# Patient Record
Sex: FEMALE | Race: BLACK OR AFRICAN AMERICAN | Employment: OTHER | URBAN - METROPOLITAN AREA
[De-identification: names, ages, dates, MRNs, and addresses within clinical notes are randomized per-mention and may not be internally consistent; named-entity substitution may affect disease eponyms.]

---

## 2017-03-15 ENCOUNTER — HOSPITAL ENCOUNTER (OUTPATIENT)
Dept: LAB | Age: 72
Discharge: HOME | End: 2017-03-15
Payer: MEDICARE

## 2017-03-15 ENCOUNTER — OFFICE VISIT (OUTPATIENT)
Dept: ONCOLOGY | Age: 72
End: 2017-03-15

## 2017-03-15 VITALS
DIASTOLIC BLOOD PRESSURE: 61 MMHG | TEMPERATURE: 96.7 F | RESPIRATION RATE: 18 BRPM | SYSTOLIC BLOOD PRESSURE: 149 MMHG | HEART RATE: 69 BPM

## 2017-03-15 DIAGNOSIS — C55 MALIGNANT NEOPLASM OF UTERUS, UNSPECIFIED SITE (HCC): ICD-10-CM

## 2017-03-15 DIAGNOSIS — N95.0 POST-MENOPAUSAL BLEEDING: ICD-10-CM

## 2017-03-15 DIAGNOSIS — N76.1 SUBACUTE VAGINITIS: Primary | ICD-10-CM

## 2017-03-15 PROCEDURE — 88305 TISSUE EXAM BY PATHOLOGIST: CPT | Performed by: OBSTETRICS & GYNECOLOGY

## 2017-03-15 RX ORDER — LANOLIN ALCOHOL/MO/W.PET/CERES
1000 CREAM (GRAM) TOPICAL DAILY
COMMUNITY
Start: 2016-10-24

## 2017-03-15 RX ORDER — LEVOTHYROXINE SODIUM 25 UG/1
25 TABLET ORAL DAILY
COMMUNITY
Start: 2017-03-13

## 2017-03-15 RX ORDER — SERTRALINE HYDROCHLORIDE 25 MG/1
25 TABLET, FILM COATED ORAL DAILY
COMMUNITY
Start: 2016-10-24

## 2017-03-15 RX ORDER — METRONIDAZOLE 500 MG/1
500 TABLET ORAL 3 TIMES DAILY
Qty: 42 TAB | Refills: 0 | Status: SHIPPED | OUTPATIENT
Start: 2017-03-15 | End: 2017-03-29

## 2017-03-15 RX ORDER — METOPROLOL TARTRATE 25 MG/1
25 TABLET, FILM COATED ORAL 2 TIMES DAILY
COMMUNITY
Start: 2016-09-19

## 2017-03-15 RX ORDER — POTASSIUM CHLORIDE 20 MEQ/1
20 TABLET, EXTENDED RELEASE ORAL DAILY
COMMUNITY
Start: 2016-10-18

## 2017-03-15 RX ORDER — VALSARTAN 160 MG/1
160 TABLET ORAL DAILY
COMMUNITY
Start: 2016-10-24

## 2017-03-15 RX ORDER — INSULIN GLARGINE 100 [IU]/ML
15 INJECTION, SOLUTION SUBCUTANEOUS
COMMUNITY
Start: 2016-10-24

## 2017-03-15 RX ORDER — HYDRALAZINE HYDROCHLORIDE 50 MG/1
50 TABLET, FILM COATED ORAL 3 TIMES DAILY
COMMUNITY
Start: 2016-10-24

## 2017-03-15 RX ORDER — ERGOCALCIFEROL 1.25 MG/1
125 CAPSULE ORAL
COMMUNITY
Start: 2016-10-24 | End: 2017-03-29

## 2017-03-15 RX ORDER — CLOPIDOGREL BISULFATE 75 MG/1
75 TABLET ORAL DAILY
COMMUNITY
Start: 2016-10-24

## 2017-03-15 RX ORDER — PEN NEEDLE, DIABETIC 31 GX3/16"
1 NEEDLE, DISPOSABLE MISCELLANEOUS
COMMUNITY
Start: 2016-10-10 | End: 2017-03-29

## 2017-03-15 RX ORDER — FUROSEMIDE 40 MG/1
20 TABLET ORAL
COMMUNITY
Start: 2016-10-24

## 2017-03-15 RX ORDER — QUETIAPINE FUMARATE 25 MG/1
25 TABLET, FILM COATED ORAL 2 TIMES DAILY
COMMUNITY
Start: 2016-10-24

## 2017-03-15 RX ORDER — PRAVASTATIN SODIUM 40 MG/1
40 TABLET ORAL
COMMUNITY
Start: 2016-10-24

## 2017-03-15 RX ORDER — AMLODIPINE BESYLATE 5 MG/1
5 TABLET ORAL DAILY
COMMUNITY
Start: 2016-10-24

## 2017-03-15 NOTE — MR AVS SNAPSHOT
Visit Information Date & Time Provider Department Dept. Phone Encounter #  
 3/15/2017  3:00 PM [id number] MD Virgilio Dong [facility]  Your Appointments 3/29/2017  4:00 PM  
PRE OP CPE with 2211 Bianka Frances MD  
[address] [facility]) Appt Note: PRE OP AND COUNSELING  
 [address] 4/17/2017  2:45 PM  
POST OP with 2211 MD Virgilio Dong [facility]) Appt Note: 2WKS POST OP  
 [id number] [address] Upcoming Health Maintenance Date Due Hepatitis C Screening 1945 DTaP/Tdap/Td series (1 - Tdap) 8/21/1966 BREAST CANCER SCRN MAMMOGRAM 8/21/1995 FOBT Q 1 YEAR AGE 50-75 8/21/1995 ZOSTER VACCINE AGE 60> 8/21/2005 GLAUCOMA SCREENING Q2Y 8/21/2010 OSTEOPOROSIS SCREENING (DEXA) 8/21/2010 Pneumococcal 65+ High/Highest Risk (1 of 2 - PCV13) 8/21/2010 MEDICARE YEARLY EXAM 8/21/2010 INFLUENZA AGE 9 TO ADULT 8/1/2016 Allergies as of 3/15/2017  Review Complete On: 3/15/2017 By: Adriana Aguiar LPN Severity Noted Reaction Type Reactions Haloperidol Lactate High 08/04/2013    Other (comments) Lisinopril High 03/31/2011    Unknown (comments) Rosiglitazone High 03/31/2011    Unknown (comments) Current Immunizations  Never Reviewed No immunizations on file. Not reviewed this visit You Were Diagnosed With   
  
 Codes Comments Subacute vaginitis    -  Primary ICD-10-CM: N76.1 ICD-9-CM: 616.10 Malignant neoplasm of uterus, unspecified site [facility])     ICD-10-CM: C55 ICD-9-CM: 494 Post-menopausal bleeding     ICD-10-CM: N95.0 ICD-9-CM: 627.1 Vitals BP Pulse Temp Resp OB Status Smoking Status 149/61 69 96.7 °F (35.9 °C) (Oral) 18 Postmenopausal Never Smoker Preferred Pharmacy Pharmacy Name Phone [facility] [address], [address] [place] [address] [phone] Your Updated Medication List  
  
   
This list is accurate as of: 3/15/17  4:28 PM.  Always use your most recent med list. amLODIPine 5 mg tablet Commonly known as:  Kaci Steff 5 mg.  
  
 clopidogrel 75 mg Tab Commonly known as:  PLAVIX 75 mg.  
  
 cyanocobalamin 1,000 mcg tablet  
1,000 mcg.  
  
 ergocalciferol 50,000 unit capsule Commonly known as:  ERGOCALCIFEROL  
50,000 Units. furosemide 40 mg tablet Commonly known as:  LASIX  
20 mg.  
  
 glucose blood VI test strips strip Commonly known as:  ASCENSIA AUTODISC VI, ONE TOUCH ULTRA TEST VI  
50 Each.  
  
 hydrALAZINE 50 mg tablet Commonly known as:  APRESOLINE 50 mg.  
  
 insulin glargine 100 unit/mL (3 mL) pen Commonly known as:  LANTUS SOLOSTAR  
15 Units. Insulin Needles (Disposable) 31 gauge x 3/16\" Ndle  
1 Units. levothyroxine 25 mcg tablet Commonly known as:  SYNTHROID  
25 mcg.  
  
 metoprolol tartrate 25 mg tablet Commonly known as:  LOPRESSOR  
25 mg.  
  
 metroNIDAZOLE 500 mg tablet Commonly known as:  FLAGYL Take 1 Tab by mouth three (3) times daily for 14 days. potassium chloride 20 mEq tablet Commonly known as:  K-DUR, KLOR-CON 20 mEq.  
  
 pravastatin 40 mg tablet Commonly known as:  PRAVACHOL 40 mg. QUEtiapine 25 mg tablet Commonly known as:  SEROquel 25 mg.  
  
 sertraline 25 mg tablet Commonly known as:  ZOLOFT  
25 mg.  
  
 valsartan 160 mg tablet Commonly known as:  DIOVAN  
160 mg.  
  
  
  
  
Prescriptions Sent to Pharmacy Refills  
 metroNIDAZOLE (FLAGYL) 500 mg tablet 0 Sig: Take 1 Tab by mouth three (3) times daily for 14 days.   
 Class: Normal  
 Pharmacy: [address], [address] [place] [address] #: [phone] Route: Oral  
  
We Performed the Following BIOPSY/EXCIS CERVICAL LESN [id number] CPT(R)] Introducing [facility] SERVICES! Romayne Duster introduces NoFlo patient portal. Now you can access parts of your medical record, email your doctor's office, and request medication refills online. 1. In your internet browser, go to [email or web address] 2. Click on the First Time User? Click Here link in the Sign In box. You will see the New Member Sign Up page. 3. Enter your NoFlo Access Code exactly as it appears below. You will not need to use this code after youve completed the sign-up process. If you do not sign up before the expiration date, you must request a new code. · NoFlo Access Code: [email or web address] Expires: 6/13/2017  4:28 PM 
 
4. Enter the last four digits of your Social Security Number (xxxx) and Date of Birth (mm/dd/yyyy) as indicated and click Submit. You will be taken to the next sign-up page. 5. Create a NoFlo ID. This will be your NoFlo login ID and cannot be changed, so think of one that is secure and easy to remember. 6. Create a NoFlo password. You can change your password at any time. 7. Enter your Password Reset Question and Answer. This can be used at a later time if you forget your password. 8. Enter your e-mail address. You will receive e-mail notification when new information is available in [address]. 9. Click Sign Up. You can now view and download portions of your medical record. 10. Click the Download Summary menu link to download a portable copy of your medical information. If you have questions, please visit the Frequently Asked Questions section of the NoFlo website. Remember, NoFlo is NOT to be used for urgent needs. For medical emergencies, dial 911. Now available from your iPhone and Android! Please provide this summary of care documentation to your next provider. Your primary care clinician is listed as [facility]. If you have any questions after today's visit, please call [phone].

## 2017-03-16 ENCOUNTER — TELEPHONE (OUTPATIENT)
Dept: ONCOLOGY | Age: 72
End: 2017-03-16

## 2017-03-16 NOTE — TELEPHONE ENCOUNTER
Please contact patient's daughter Patrick Ramos at [phone] regarding appointment on 3/29/17 and her mother's results. Daughter is requesting a sooner appointment.

## 2017-03-16 NOTE — PATIENT INSTRUCTIONS
Uterine Cancer: Care Instructions  Your Care Instructions  Uterine cancer is the rapid growth of abnormal cells that line the uterus. It also is called endometrial cancer. These cells may spread to nearby organs, lymph glands, or distant organs. Uterine cancer can be cured most often when found early. Treatment may include surgery to remove the uterus, ovaries, fallopian tubes, and sometimes the pelvic lymph nodes. Radiation and hormones to stop cancer growth also are sometimes used. Chemotherapy may be used if the cancer has spread. Having cancer can be scary. You may feel many emotions and may need some help coping. Seek out family, friends, and counselors for support. You can do things at home to make yourself feel better while you go through treatment. You also can call the [employer] (1-980.677.7818) or visit its website at GigaTrust for more information. Follow-up care is a key part of your treatment and safety. Be sure to make and go to all appointments, and call your doctor if you are having problems. It's also a good idea to know your test results and keep a list of the medicines you take. How can you care for yourself at home? · Take your medicines exactly as prescribed. Call your doctor if you think you are having a problem with your medicine. You may get medicine for nausea and vomiting if you have these side effects. · Eat healthy food. If you do not feel like eating, try to eat food that has protein and extra calories to keep up your strength and prevent weight loss. Drink liquid meal replacements for extra calories and protein. Try to eat your main meal early. · Get some physical activity every day, but do not get too tired. Keep doing the hobbies you enjoy as your energy allows. · Take steps to control your stress and workload. Learn relaxation techniques. ¨ Share your feelings. Stress and tension affect our emotions.  By expressing your feelings to others, you may be able to understand and cope with them. ¨ Consider joining a support group. Talking about a problem with your spouse, a good friend, or other people with similar problems is a good way to reduce tension and stress. ¨ Express yourself through art. Try writing, dance, art, or crafts to relieve tension. Some dance, writing, or art groups may be available just for people who have cancer. ¨ Be kind to your body and mind. Getting enough sleep, eating a healthy diet, and taking time to do things you enjoy can contribute to an overall feeling of balance in your life and help reduce stress. ¨ Get help if you need it. Discuss your concerns with your doctor or counselor. · If you are vomiting or have diarrhea:  ¨ Drink plenty of fluids (enough so that your urine is light yellow or clear like water) to prevent dehydration. Choose water and other caffeine-free clear liquids. If you have kidney, heart, or liver disease and have to limit fluids, talk with your doctor before you increase the amount of fluids you drink. ¨ When you are able to eat, try clear soups, mild foods, and liquids until all symptoms are gone for 12 to 48 hours. Other good choices include dry toast, crackers, cooked cereal, and gelatin dessert, such as Jell-O.  · Take care of your urinary tract to prevent problems such as infection, which can be caused by uterine cancer and its treatment. Limit drinks with caffeine, drink plenty of fluids, and urinate every 3 to 4 hours. Consider adding cranberry juice to your diet. · If you have not already done so, prepare a list of advance directives. Advance directives are instructions to your doctor and family members about what kind of care you want if you become unable to speak or express yourself. When should you call for help? Call 911 anytime you think you may need emergency care. For example, call if:  · You passed out (lost consciousness). · You have severe belly pain.   Call your doctor now or seek immediate medical care if:  · You have severe vaginal bleeding. You are passing blood clots and soaking through a pad each hour for 2 or more hours. · You have belly pain. · You are dizzy or lightheaded, or you feel like you may faint. · You have a fever. · You have severe constipation. · You stop urinating. · You have severe vomiting that you cannot control. Watch closely for changes in your health, and be sure to contact your doctor if:  · You have spotting of blood from your vagina. · You feel very sad, anxious or both. Where can you learn more? Go to http://edison-lucia.info/. Enter E343 in the search box to learn more about \"Uterine Cancer: Care Instructions. \"  Current as of: July 26, 2016  Content Version: 11.1  © 3198-5817 [employer]. Care instructions adapted under license by [employer] (which disclaims liability or warranty for this information). If you have questions about a medical condition or this instruction, always ask your healthcare professional. [place] [address] any warranty or liability for your use of this information. Laparoscopic Hysterectomy: Before Your Surgery  What is a laparoscopic hysterectomy? A hysterectomy is surgery to take out the uterus. In some cases, the ovaries and fallopian tubes also are taken out at the same time. The doctor makes one or more small cuts in the belly. These cuts are called incisions. They let the doctor insert tools to do the surgery. One of these tools is a tube with a light on it. It's called a laparoscope, or scope. The scope and the other tools allow the doctor to free the uterus. The doctor then removes the uterus through the small cuts. In a total hysterectomy, the doctor takes out the uterus and the cervix. In a supracervical hysterectomy, only the uterus is taken out. Most women go home in 1 to 2 days. You may need about 4 to 6 weeks to fully recover.   After the surgery, you will not have periods. You will not be able to get pregnant. If there is a chance that you will want to have a baby, talk to your doctor about other treatment options. Your doctor may advise you to take hormone pills if your ovaries are removed. Your doctor will talk to you about the risks and benefits of hormones. He or she will also tell you how long to take them. This surgery probably won't lower your interest in sex. In fact, some women enjoy sex more. This may be because they no longer have to worry about birth control or heavy bleeding. Follow-up care is a key part of your treatment and safety. Be sure to make and go to all appointments, and call your doctor if you are having problems. It's also a good idea to know your test results and keep a list of the medicines you take. What happens before surgery? Surgery can be stressful. This information will help you understand what you can expect. And it will help you safely prepare for surgery. Preparing for surgery  · Understand exactly what surgery is planned, along with the risks, benefits, and other options. · Tell your doctors ALL the medicines, vitamins, supplements, and herbal remedies you take. Some of these can increase the risk of bleeding or interact with anesthesia. · If you take blood thinners, such as warfarin (Coumadin), clopidogrel (Plavix), or aspirin, be sure to talk to your doctor. He or she will tell you if you should stop taking these medicines before your surgery. Make sure that you understand exactly what your doctor wants you to do. · Your doctor will tell you which medicines to take or stop before your surgery. You may need to stop taking certain medicines a week or more before surgery. So talk to your doctor as soon as you can. · If you have an advance directive, let your doctor know. It may include a living will and a durable power of  for health care.  Bring a copy to the hospital. If you don't have one, you may want to prepare one. It lets your doctor and loved ones know your health care wishes. Doctors advise that everyone prepare these papers before any type of surgery or procedure. What happens on the day of surgery? · Follow the instructions exactly about when to stop eating and drinking. If you don't, your surgery may be canceled. If your doctor told you to take your medicines on the day of surgery, take them with only a sip of water. · Take a bath or shower before you come in for your surgery. Do not apply lotions, perfumes, deodorants, or nail polish. · Do not shave the surgical site yourself. · Take off all jewelry and piercings. And take out contact lenses, if you wear them. At the hospital or surgery center  · Bring a picture ID. · The area for surgery is often marked to make sure there are no errors. · You will be kept comfortable and safe by your anesthesia provider. You will be asleep during the surgery. · The surgery will take about 2 to 4 hours. Going home  · Be sure you have someone to drive you home. Anesthesia and pain medicine make it unsafe for you to drive. · You will be given more specific instructions about recovering from your surgery. They will cover things like diet, wound care, follow-up care, driving, and getting back to your normal routine. When should you call your doctor? · You have questions or concerns. · You don't understand how to prepare for your surgery. · You become ill before the surgery (such as fever, flu, or a cold). · You need to reschedule or have changed your mind about having the surgery. Where can you learn more? Go to http://edison-lucia.info/. Enter D130 in the search box to learn more about \"Laparoscopic Hysterectomy: Before Your Surgery. \"  Current as of: February 25, 2016  Content Version: 11.1  © 4321-7081 [employer].  Care instructions adapted under license by [employer] (which disclaims liability or warranty for this information). If you have questions about a medical condition or this instruction, always ask your healthcare professional. [place] [address] any warranty or liability for your use of this information.

## 2017-03-20 NOTE — TELEPHONE ENCOUNTER
Spoke with daughter and she decided to keep appointment on the 29th so she only has to make one trip for pre op testing and results.

## 2017-03-29 ENCOUNTER — HOSPITAL ENCOUNTER (OUTPATIENT)
Dept: GENERAL RADIOLOGY | Age: 72
Discharge: HOME | End: 2017-03-29
Attending: OBSTETRICS & GYNECOLOGY
Payer: MEDICARE

## 2017-03-29 ENCOUNTER — HOSPITAL ENCOUNTER (OUTPATIENT)
Dept: PREADMISSION TESTING | Age: 72
Discharge: HOME | End: 2017-03-29
Payer: MEDICARE

## 2017-03-29 ENCOUNTER — OFFICE VISIT (OUTPATIENT)
Dept: ONCOLOGY | Age: 72
End: 2017-03-29

## 2017-03-29 VITALS
RESPIRATION RATE: 18 BRPM | DIASTOLIC BLOOD PRESSURE: 84 MMHG | TEMPERATURE: 97.7 F | SYSTOLIC BLOOD PRESSURE: 127 MMHG | HEART RATE: 102 BPM

## 2017-03-29 VITALS — HEIGHT: 60 IN

## 2017-03-29 DIAGNOSIS — N95.0 POST-MENOPAUSAL BLEEDING: ICD-10-CM

## 2017-03-29 DIAGNOSIS — N76.1 SUBACUTE VAGINITIS: ICD-10-CM

## 2017-03-29 DIAGNOSIS — C55 CARCINOSARCOMA OF UTERUS (HCC): Primary | ICD-10-CM

## 2017-03-29 LAB
ALBUMIN SERPL BCP-MCNC: 3 G/DL (ref 3.4–5)
ALBUMIN/GLOB SERPL: 0.6 {RATIO} (ref 0.8–1.7)
ALP SERPL-CCNC: 86 U/L (ref 45–117)
ALT SERPL-CCNC: 52 U/L (ref 13–56)
ANION GAP BLD CALC-SCNC: 10 MMOL/L (ref 3–18)
AST SERPL W P-5'-P-CCNC: 37 U/L (ref 15–37)
BASOPHILS # BLD AUTO: 0.1 K/UL (ref 0–0.06)
BASOPHILS # BLD: 1 % (ref 0–2)
BILIRUB SERPL-MCNC: 0.3 MG/DL (ref 0.2–1)
BUN SERPL-MCNC: 21 MG/DL (ref 7–18)
BUN/CREAT SERPL: 23 (ref 12–20)
CALCIUM SERPL-MCNC: 9.2 MG/DL (ref 8.5–10.1)
CHLORIDE SERPL-SCNC: 109 MMOL/L (ref 100–108)
CO2 SERPL-SCNC: 27 MMOL/L (ref 21–32)
CREAT SERPL-MCNC: 0.93 MG/DL (ref 0.6–1.3)
DIFFERENTIAL METHOD BLD: ABNORMAL
EOSINOPHIL # BLD: 0.2 K/UL (ref 0–0.4)
EOSINOPHIL NFR BLD: 3 % (ref 0–5)
ERYTHROCYTE [DISTWIDTH] IN BLOOD BY AUTOMATED COUNT: 14.6 % (ref 11.6–14.5)
EST. AVERAGE GLUCOSE BLD GHB EST-MCNC: 177 MG/DL
GLOBULIN SER CALC-MCNC: 4.7 G/DL (ref 2–4)
GLUCOSE SERPL-MCNC: 306 MG/DL (ref 74–99)
HBA1C MFR BLD: 7.8 % (ref 4.5–5.6)
HCT VFR BLD AUTO: 36.3 % (ref 35–45)
HGB BLD-MCNC: 11.7 G/DL (ref 12–16)
LYMPHOCYTES # BLD AUTO: 33 % (ref 21–52)
LYMPHOCYTES # BLD: 2.9 K/UL (ref 0.9–3.6)
MCH RBC QN AUTO: 29.1 PG (ref 24–34)
MCHC RBC AUTO-ENTMCNC: 32.2 G/DL (ref 31–37)
MCV RBC AUTO: 90.3 FL (ref 74–97)
MONOCYTES # BLD: 0.4 K/UL (ref 0.05–1.2)
MONOCYTES NFR BLD AUTO: 5 % (ref 3–10)
NEUTS SEG # BLD: 5.3 K/UL (ref 1.8–8)
NEUTS SEG NFR BLD AUTO: 58 % (ref 40–73)
PLATELET # BLD AUTO: 294 K/UL (ref 135–420)
PMV BLD AUTO: 9.8 FL (ref 9.2–11.8)
POTASSIUM SERPL-SCNC: 4.5 MMOL/L (ref 3.5–5.5)
PROT SERPL-MCNC: 7.7 G/DL (ref 6.4–8.2)
RBC # BLD AUTO: 4.02 M/UL (ref 4.2–5.3)
SODIUM SERPL-SCNC: 146 MMOL/L (ref 136–145)
WBC # BLD AUTO: 8.9 K/UL (ref 4.6–13.2)

## 2017-03-29 PROCEDURE — 36415 COLL VENOUS BLD VENIPUNCTURE: CPT | Performed by: OBSTETRICS & GYNECOLOGY

## 2017-03-29 PROCEDURE — 71010 XR CHEST SNGL V: CPT

## 2017-03-29 PROCEDURE — 80053 COMPREHEN METABOLIC PANEL: CPT | Performed by: OBSTETRICS & GYNECOLOGY

## 2017-03-29 PROCEDURE — 93005 ELECTROCARDIOGRAM TRACING: CPT

## 2017-03-29 PROCEDURE — 83036 HEMOGLOBIN GLYCOSYLATED A1C: CPT | Performed by: OBSTETRICS & GYNECOLOGY

## 2017-03-29 PROCEDURE — 85025 COMPLETE CBC W/AUTO DIFF WBC: CPT | Performed by: OBSTETRICS & GYNECOLOGY

## 2017-03-29 RX ORDER — OMEPRAZOLE 20 MG/1
20 CAPSULE, DELAYED RELEASE ORAL DAILY
COMMUNITY

## 2017-03-29 RX ORDER — GLUCOSAMINE SULFATE 1500 MG
125 POWDER IN PACKET (EA) ORAL DAILY
COMMUNITY

## 2017-03-29 NOTE — PROGRESS NOTES
RBA of procedure d/w pt risks including but not limited to bleeding, infection, injury to pelvic organs, bowel, bladder, major neural and vascular structures. Patient given pre and post op instructions. Consents signed and witnessed by myself and performing surgeon. Hx reviewed including current medication and any drug allergies. All questions were answered. Patient verbalized understanding.

## 2017-03-29 NOTE — PATIENT INSTRUCTIONS
Cervical Cancer: Care Instructions  Your Care Instructions  Cervical cancer occurs when abnormal cells on the cervix grow out of control. These cells may spread to nearby organs, lymph glands, or distant organs. The cervix is the lower part of the uterus that opens into the vagina. This form of cancer can be cured most of the time, especially when found at an early stage. It is usually found at a very early stage through a Pap smear. If the cancer is in an early stage, you may need to have only a small part of the cervix removed. This type of surgery may allow a woman to become pregnant later. In other cases, removal of the cervix and uterus (hysterectomy) may be the better choice. Treatment also may include radiation or chemotherapy. You may get medicines to help with chemotherapy or radiation side effects, such as nausea and vomiting or tiredness. Finding out that you have cancer is scary. You may feel many emotions and may need some help coping. Seek out family, friends, and counselors for support. You also can do things at home to make yourself feel better while you go through treatment. Call the [facility] ([phone]) or visit its website at [address] for more information. Follow-up care is a key part of your treatment and safety. Be sure to make and go to all appointments, and call your doctor if you are having problems. It's also a good idea to know your test results and keep a list of the medicines you take. How can you care for yourself at home? · Take your medicines exactly as prescribed. Call your doctor if you think you are having a problem with your medicine. You may get medicine for nausea and vomiting if you have these side effects. · Eat healthy food. If you do not feel like eating, try to eat food that has protein and extra calories to keep up your strength and prevent weight loss. Drink liquid meal replacements for extra calories and protein.  Try to eat your main meal early. · Get some physical activity every day, but do not get too tired. Keep doing the hobbies you enjoy as your energy allows. · Take steps to control your stress and workload. Learn relaxation techniques. ¨ Share your feelings. Stress and tension affect our emotions. By expressing your feelings to others, you may be able to understand and cope with them. ¨ Consider joining a support group. Talking about a problem with your spouse, a good friend, or other people with similar problems is a good way to reduce tension and stress. ¨ Express yourself through art. Try writing, dance, art, or crafts to relieve tension. Some dance, writing, or art groups may be available just for people who have cancer. ¨ Be kind to your body and mind. Getting enough sleep, eating a healthy diet, and taking time to do things you enjoy can contribute to an overall feeling of balance in your life and help reduce stress. ¨ Get help if you need it. Discuss your concerns with your doctor or counselor. · If you are vomiting or have diarrhea:  ¨ Drink plenty of fluids (enough so that your urine is light yellow or clear like water) to prevent dehydration. Choose water and other caffeine-free clear liquids. If you have kidney, heart, or liver disease and have to limit fluids, talk with your doctor before you increase the amount of fluids you drink. ¨ When you are able to eat, try clear soups, mild foods, and liquids until all symptoms are gone for 12 to 48 hours. Other good choices include dry toast, crackers, cooked cereal, and gelatin dessert, such as Jell-O.  ¨ Take care of your urinary tract to prevent problems such as infection, which can be caused by cervical cancer and its treatment. Limit drinks with caffeine, drink plenty of fluids, and urinate every 3 or 4 hours. · If you have not already done so, prepare a list of advance directives.  Advance directives are instructions to your doctor and family members about what kind of care you want if you become unable to speak or express yourself. When should you call for help? Call 911 anytime you think you may need emergency care. For example, call if:  · You passed out (lost consciousness). Call your doctor now or seek immediate medical care if:  · You are dizzy or lightheaded, or you feel like you may faint. · You have severe vaginal bleeding. You are passing blood clots and soaking through a pad each hour for 2 or more hours. · You have blood or pus in your urine. · You have pain in your back just below your rib cage. This is called flank pain. · You have a fever, chills, or body aches. · It hurts to urinate or you have to urinate often. · You cannot control your bladder. · You have groin or belly pain. Watch closely for changes in your health, and be sure to contact your doctor if:  · You feel very sad, anxious, or both. Where can you learn more? Go to http://edison-lucia.info/. Enter F505 in the search box to learn more about \"Cervical Cancer: Care Instructions. \"  Current as of: November 28, 2016  Content Version: 11.2  © 8372-5690 [employer]. Care instructions adapted under license by [employer] (which disclaims liability or warranty for this information). If you have questions about a medical condition or this instruction, always ask your healthcare professional. [place] [address] any warranty or liability for your use of this information. Laparoscopic Hysterectomy: What to Expect at [address]    A hysterectomy is surgery to take out the uterus. In some cases, the ovaries and fallopian tubes also are taken out at the same time. You can expect to feel better and stronger each day, but you may need pain medicine for a week or two. You may get tired easily or have less energy than usual. The tiredness may last for several weeks after surgery.  You will probably notice that your belly is swollen and puffy. This is common. The swelling will take several weeks to go down. You may take about 4 to 6 weeks to fully recover. It is important to avoid lifting while you are recovering so that you can heal.  This care sheet gives you a general idea about how long it will take for you to recover. But each person recovers at a different pace. Follow the steps below to get better as quickly as possible. How can you care for yourself at home? Activity  · Rest when you feel tired. · Be active. Walking is a good choice. · Allow the area to heal. Don't move quickly or lift anything heavy until you are feeling better. · You may shower 24 to 48 hours after surgery, if your doctor okays it. Pat the incision dry. Do not take a bath for the first 2 weeks, or until your doctor tells you it is okay. · Ask your doctor when it is okay for you to have sex. Diet  · You can eat your normal diet. If your stomach is upset, try bland, low-fat foods like plain rice, broiled chicken, toast, and yogurt. · If your bowel movements are not regular right after surgery, try to avoid constipation and straining. Drink plenty of water. Your doctor may suggest fiber, a stool softener, or a mild laxative. Medicines  · Your doctor will tell you if and when you can restart your medicines. He or she will also give you instructions about taking any new medicines. · If you take blood thinners, such as warfarin (Coumadin), clopidogrel (Plavix), or aspirin, be sure to talk to your doctor. He or she will tell you if and when to start taking those medicines again. Make sure that you understand exactly what your doctor wants you to do. · Be safe with medicines. Read and follow all instructions on the label. ¨ If the doctor gave you a prescription medicine for pain, take it as prescribed. ¨ If you are not taking a prescription pain medicine, ask your doctor if you can take an over-the-counter medicine.   · If your doctor prescribed antibiotics, take them as directed. Do not stop taking them just because you feel better. You need to take the full course of antibiotics. Incision care  · You may have stitches over the cuts (incisions) the doctor made in your belly. · If you have strips of tape on the cut (incision) the doctor made, leave the tape on for a week or until it falls off. · Wash the area daily with warm, soapy water, and pat it dry. Don't use hydrogen peroxide or alcohol. They can slow healing. · You may cover the area with a gauze bandage if it oozes fluid or rubs against clothing. · Change the bandage every day. Other instructions  · You may have some light vaginal bleeding. Wear sanitary pads if needed. Do not douche or use tampons. · Don't have sex until the doctor says it is okay. Follow-up care is a key part of your treatment and safety. Be sure to make and go to all appointments, and call your doctor if you are having problems. It's also a good idea to know your test results and keep a list of the medicines you take. When should you call for help? Call 911 anytime you think you may need emergency care. For example, call if:  · You passed out (lost consciousness). · You have sudden chest pain and shortness of breath, or you cough up blood. Call your doctor now or seek immediate medical care if:  · You have severe vaginal bleeding. This means that you are soaking through your usual pads every hour for 2 or more hours. · You have sudden, severe pain in your belly or pelvis. · You have loose stitches, or your incision comes open. · You have signs of infection, such as:  ¨ Increased pain, swelling, warmth, or redness. ¨ Red streaks leading from the incision. ¨ Pus draining from the incision. ¨ Swollen lymph nodes in your neck, armpits, or groin. ¨ A fever. Watch closely for changes in your health, and be sure to contact your doctor if:  · You do not get better as expected. Where can you learn more?   Go to http://edison-lucia.info/. Enter Q131 in the search box to learn more about \"Laparoscopic Hysterectomy: What to Expect at Home. \"  Current as of: October 13, 2016  Content Version: 11.2  © 7743-8565 [employer], [facility]. Care instructions adapted under license by [employer] (which disclaims liability or warranty for this information). If you have questions about a medical condition or this instruction, always ask your healthcare professional. [address] any warranty or liability for your use of this information. Laparoscopic Hysterectomy: Before Your Surgery  What is a laparoscopic hysterectomy? A hysterectomy is surgery to take out the uterus. In some cases, the ovaries and fallopian tubes also are taken out at the same time. The doctor makes one or more small cuts in the belly. These cuts are called incisions. They let the doctor insert tools to do the surgery. One of these tools is a tube with a light on it. It's called a laparoscope, or scope. The scope and the other tools allow the doctor to free the uterus. The doctor then removes the uterus through the small cuts. In a total hysterectomy, the doctor takes out the uterus and the cervix. In a supracervical hysterectomy, only the uterus is taken out. Most women go home in 1 to 2 days. You may need about 4 to 6 weeks to fully recover. After the surgery, you will not have periods. You will not be able to get pregnant. If there is a chance that you will want to have a baby, talk to your doctor about other treatment options. Your doctor may advise you to take hormone pills if your ovaries are removed. Your doctor will talk to you about the risks and benefits of hormones. He or she will also tell you how long to take them. This surgery probably won't lower your interest in sex. In fact, some women enjoy sex more.  This may be because they no longer have to worry about birth control or heavy bleeding. Follow-up care is a key part of your treatment and safety. Be sure to make and go to all appointments, and call your doctor if you are having problems. It's also a good idea to know your test results and keep a list of the medicines you take. What happens before surgery? Surgery can be stressful. This information will help you understand what you can expect. And it will help you safely prepare for surgery. Preparing for surgery  · Understand exactly what surgery is planned, along with the risks, benefits, and other options. · Tell your doctors ALL the medicines, vitamins, supplements, and herbal remedies you take. Some of these can increase the risk of bleeding or interact with anesthesia. · If you take blood thinners, such as warfarin (Coumadin), clopidogrel (Plavix), or aspirin, be sure to talk to your doctor. He or she will tell you if you should stop taking these medicines before your surgery. Make sure that you understand exactly what your doctor wants you to do. · Your doctor will tell you which medicines to take or stop before your surgery. You may need to stop taking certain medicines a week or more before surgery. So talk to your doctor as soon as you can. · If you have an advance directive, let your doctor know. It may include a living will and a durable power of  for health care. Bring a copy to the hospital. If you don't have one, you may want to prepare one. It lets your doctor and loved ones know your health care wishes. Doctors advise that everyone prepare these papers before any type of surgery or procedure. What happens on the day of surgery? · Follow the instructions exactly about when to stop eating and drinking. If you don't, your surgery may be canceled. If your doctor told you to take your medicines on the day of surgery, take them with only a sip of water. · Take a bath or shower before you come in for your surgery.  Do not apply lotions, perfumes, deodorants, or nail polish. · Do not shave the surgical site yourself. · Take off all jewelry and piercings. And take out contact lenses, if you wear them. At the hospital or surgery center  · Bring a picture ID. · The area for surgery is often marked to make sure there are no errors. · You will be kept comfortable and safe by your anesthesia provider. You will be asleep during the surgery. · The surgery will take about 2 to 4 hours. Going home  · Be sure you have someone to drive you home. Anesthesia and pain medicine make it unsafe for you to drive. · You will be given more specific instructions about recovering from your surgery. They will cover things like diet, wound care, follow-up care, driving, and getting back to your normal routine. When should you call your doctor? · You have questions or concerns. · You don't understand how to prepare for your surgery. · You become ill before the surgery (such as fever, flu, or a cold). · You need to reschedule or have changed your mind about having the surgery. Where can you learn more? Go to http://edison-lucia.info/. Enter D130 in the search box to learn more about \"Laparoscopic Hysterectomy: Before Your Surgery. \"  Current as of: October 13, 2016  Content Version: 11.2  © 0399-0669 [employer]. Care instructions adapted under license by [employer] (which disclaims liability or warranty for this information). If you have questions about a medical condition or this instruction, always ask your healthcare professional. [place] [address] any warranty or liability for your use of this information.

## 2017-03-29 NOTE — PERIOP NOTES
No sleep apnea or previous sleep studies. No history of MH. PCP is aware of surgery. Care fusion instructions reviewed and wipes given. Meets criteria for special population at this time, OR posting notified. Not participating in clinical trials or research study. Patient has had stroke, left leg contracted, unable to get weight, will need bed weight DOS. OK to view PCP notes in CE. Requested Daughter check with PCP on pre-op insulin dose. Daughter will also ask Dr Shiv Lamar about jose antonio-operative Plavix doses at appt. Later today. Patient escorted to lab by Charlene Villalpando MA  for blood tests and Radiology for CXR.

## 2017-03-29 NOTE — MR AVS SNAPSHOT
Visit Information Date & Time Provider Department Dept. Phone Encounter #  
 3/29/2017  4:00 PM MD Abiodun [facility] [phone] Your Appointments 4/17/2017  2:45 PM  
POST OP with MD Abiodun [facility]) Appt Note: 2WKS POST OP  
 [id number] [address] Upcoming Health Maintenance Date Due Hepatitis C Screening 1945 HEMOGLOBIN A1C Q6M 1945 LIPID PANEL Q1 1945 FOOT EXAM Q1 8/21/1955 MICROALBUMIN Q1 8/21/1955 EYE EXAM RETINAL OR DILATED Q1 8/21/1955 DTaP/Tdap/Td series (1 - Tdap) 8/21/1966 BREAST CANCER SCRN MAMMOGRAM 8/21/1995 FOBT Q 1 YEAR AGE 50-75 8/21/1995 ZOSTER VACCINE AGE 60> 8/21/2005 GLAUCOMA SCREENING Q2Y 8/21/2010 OSTEOPOROSIS SCREENING (DEXA) 8/21/2010 Pneumococcal 65+ High/Highest Risk (1 of 2 - PCV13) 8/21/2010 MEDICARE YEARLY EXAM 8/21/2010 INFLUENZA AGE 9 TO ADULT 8/1/2016 Allergies as of 3/29/2017  Review Complete On: 3/29/2017 By: Romayne Gales, LPN Severity Noted Reaction Type Reactions Haloperidol Lactate High 08/04/2013    Other (comments) Lisinopril High 03/31/2011    Unknown (comments) Rosiglitazone High 03/31/2011    Unknown (comments) Current Immunizations  Never Reviewed No immunizations on file. Not reviewed this visit Vitals BP Pulse Temp Resp OB Status Smoking Status 127/84 (!) 102 97.7 °F (36.5 °C) (Oral) 18 Postmenopausal Never Smoker Preferred Pharmacy Pharmacy Name Phone [address] [phone] Your Updated Medication List  
  
   
This list is accurate as of: 3/29/17  5:14 PM.  Always use your most recent med list. amLODIPine 5 mg tablet Commonly known as:  Imer Presser 5 mg daily. clopidogrel 75 mg Tab Commonly known as:  PLAVIX 75 mg daily. cyanocobalamin 1,000 mcg tablet  
1,000 mcg daily. furosemide 40 mg tablet Commonly known as:  LASIX  
20 mg.  
  
 hydrALAZINE 50 mg tablet Commonly known as:  APRESOLINE 50 mg three (3) times daily. insulin glargine 100 unit/mL (3 mL) pen Commonly known as:  LANTUS SOLOSTAR  
15 Units nightly. levothyroxine 25 mcg tablet Commonly known as:  SYNTHROID  
25 mcg daily. metoprolol tartrate 25 mg tablet Commonly known as:  LOPRESSOR  
25 mg two (2) times a day. metroNIDAZOLE 500 mg tablet Commonly known as:  FLAGYL Take 1 Tab by mouth three (3) times daily for 14 days. omeprazole 20 mg capsule Commonly known as:  PRILOSEC Take 20 mg by mouth daily. potassium chloride 20 mEq tablet Commonly known as:  K-DUR, KLOR-CON 20 mEq daily. pravastatin 40 mg tablet Commonly known as:  PRAVACHOL 40 mg nightly. QUEtiapine 25 mg tablet Commonly known as:  SEROquel 25 mg two (2) times a day. sertraline 25 mg tablet Commonly known as:  ZOLOFT Take 25 mg by mouth daily. valsartan 160 mg tablet Commonly known as:  DIOVAN  
160 mg daily. VITAMIN D3 1,000 unit Cap Generic drug:  cholecalciferol Take 125 Units by mouth daily. Patient Instructions Cervical Cancer: Care Instructions Your Care Instructions Cervical cancer occurs when abnormal cells on the cervix grow out of control. These cells may spread to nearby organs, lymph glands, or distant organs. The cervix is the lower part of the uterus that opens into the vagina. This form of cancer can be cured most of the time, especially when found at an early stage. It is usually found at a very early stage through a Pap smear.  
If the cancer is in an early stage, you may need to have only a small part of the cervix removed. This type of surgery may allow a woman to become pregnant later. In other cases, removal of the cervix and uterus (hysterectomy) may be the better choice. Treatment also may include radiation or chemotherapy. You may get medicines to help with chemotherapy or radiation side effects, such as nausea and vomiting or tiredness. Finding out that you have cancer is scary. You may feel many emotions and may need some help coping. Seek out family, friends, and counselors for support. You also can do things at home to make yourself feel better while you go through treatment. Call the [employer] Nancy Lali ([phone]) or visit its website at [address] [employer] for more information. Follow-up care is a key part of your treatment and safety. Be sure to make and go to all appointments, and call your doctor if you are having problems. It's also a good idea to know your test results and keep a list of the medicines you take. How can you care for yourself at home? · Take your medicines exactly as prescribed. Call your doctor if you think you are having a problem with your medicine. You may get medicine for nausea and vomiting if you have these side effects. · Eat healthy food. If you do not feel like eating, try to eat food that has protein and extra calories to keep up your strength and prevent weight loss. Drink liquid meal replacements for extra calories and protein. Try to eat your main meal early. · Get some physical activity every day, but do not get too tired. Keep doing the hobbies you enjoy as your energy allows. · Take steps to control your stress and workload. Learn relaxation techniques. ¨ Share your feelings. Stress and tension affect our emotions. By expressing your feelings to others, you may be able to understand and cope with them. ¨ Consider joining a support group.  Talking about a problem with your spouse, a good friend, or other people with similar problems is a good way to reduce tension and stress. ¨ Express yourself through art. Try writing, dance, art, or crafts to relieve tension. Some dance, writing, or art groups may be available just for people who have cancer. ¨ Be kind to your body and mind. Getting enough sleep, eating a healthy diet, and taking time to do things you enjoy can contribute to an overall feeling of balance in your life and help reduce stress. ¨ Get help if you need it. Discuss your concerns with your doctor or counselor. · If you are vomiting or have diarrhea: ¨ Drink plenty of fluids (enough so that your urine is light yellow or clear like water) to prevent dehydration. Choose water and other caffeine-free clear liquids. If you have kidney, heart, or liver disease and have to limit fluids, talk with your doctor before you increase the amount of fluids you drink. ¨ When you are able to eat, try clear soups, mild foods, and liquids until all symptoms are gone for 12 to 48 hours. Other good choices include dry toast, crackers, cooked cereal, and gelatin dessert, such as Jell-O. 
¨ Take care of your urinary tract to prevent problems such as infection, which can be caused by cervical cancer and its treatment. Limit drinks with caffeine, drink plenty of fluids, and urinate every 3 or 4 hours. · If you have not already done so, prepare a list of advance directives. Advance directives are instructions to your doctor and family members about what kind of care you want if you become unable to speak or express yourself. When should you call for help? Call 911 anytime you think you may need emergency care. For example, call if: 
· You passed out (lost consciousness). Call your doctor now or seek immediate medical care if: 
· You are dizzy or lightheaded, or you feel like you may faint. · You have severe vaginal bleeding. You are passing blood clots and soaking through a pad each hour for 2 or more hours. · You have blood or pus in your urine. · You have pain in your back just below your rib cage. This is called flank pain. · You have a fever, chills, or body aches. · It hurts to urinate or you have to urinate often. · You cannot control your bladder. · You have groin or belly pain. Watch closely for changes in your health, and be sure to contact your doctor if: 
· You feel very sad, anxious, or both. Where can you learn more? Go to http://edison-lucia.info/. Enter F505 in the search box to learn more about \"Cervical Cancer: Care Instructions. \" Current as of: November 28, 2016 Content Version: 11.2 © 1058-7420 [employer]. Care instructions adapted under license by [employer] (which disclaims liability or warranty for this information). If you have questions about a medical condition or this instruction, always ask your healthcare professional. [address] any warranty or liability for your use of this information. Laparoscopic Hysterectomy: What to Expect at [facility] Your Recovery A hysterectomy is surgery to take out the uterus. In some cases, the ovaries and fallopian tubes also are taken out at the same time. You can expect to feel better and stronger each day, but you may need pain medicine for a week or two. You may get tired easily or have less energy than usual. The tiredness may last for several weeks after surgery. You will probably notice that your belly is swollen and puffy. This is common. The swelling will take several weeks to go down. You may take about 4 to 6 weeks to fully recover. It is important to avoid lifting while you are recovering so that you can heal. 
This care sheet gives you a general idea about how long it will take for you to recover. But each person recovers at a different pace. Follow the steps below to get better as quickly as possible. How can you care for yourself at home? Activity · Rest when you feel tired. · Be active. Walking is a good choice. · Allow the area to heal. Don't move quickly or lift anything heavy until you are feeling better. · You may shower 24 to 48 hours after surgery, if your doctor okays it. Pat the incision dry. Do not take a bath for the first 2 weeks, or until your doctor tells you it is okay. · Ask your doctor when it is okay for you to have sex. Diet · You can eat your normal diet. If your stomach is upset, try bland, low-fat foods like plain rice, broiled chicken, toast, and yogurt. · If your bowel movements are not regular right after surgery, try to avoid constipation and straining. Drink plenty of water. Your doctor may suggest fiber, a stool softener, or a mild laxative. Medicines · Your doctor will tell you if and when you can restart your medicines. He or she will also give you instructions about taking any new medicines. · If you take blood thinners, such as warfarin (Coumadin), clopidogrel (Plavix), or aspirin, be sure to talk to your doctor. He or she will tell you if and when to start taking those medicines again. Make sure that you understand exactly what your doctor wants you to do. · Be safe with medicines. Read and follow all instructions on the label. ¨ If the doctor gave you a prescription medicine for pain, take it as prescribed. ¨ If you are not taking a prescription pain medicine, ask your doctor if you can take an over-the-counter medicine. · If your doctor prescribed antibiotics, take them as directed. Do not stop taking them just because you feel better. You need to take the full course of antibiotics. Incision care · You may have stitches over the cuts (incisions) the doctor made in your belly. · If you have strips of tape on the cut (incision) the doctor made, leave the tape on for a week or until it falls off. · Wash the area daily with warm, soapy water, and pat it dry. Don't use hydrogen peroxide or alcohol. They can slow healing. · You may cover the area with a gauze bandage if it oozes fluid or rubs against clothing. · Change the bandage every day. Other instructions · You may have some light vaginal bleeding. Wear sanitary pads if needed. Do not douche or use tampons. · Don't have sex until the doctor says it is okay. Follow-up care is a key part of your treatment and safety. Be sure to make and go to all appointments, and call your doctor if you are having problems. It's also a good idea to know your test results and keep a list of the medicines you take. When should you call for help? Call 911 anytime you think you may need emergency care. For example, call if: 
· You passed out (lost consciousness). · You have sudden chest pain and shortness of breath, or you cough up blood. Call your doctor now or seek immediate medical care if: 
· You have severe vaginal bleeding. This means that you are soaking through your usual pads every hour for 2 or more hours. · You have sudden, severe pain in your belly or pelvis. · You have loose stitches, or your incision comes open. · You have signs of infection, such as: 
¨ Increased pain, swelling, warmth, or redness. ¨ Red streaks leading from the incision. ¨ Pus draining from the incision. ¨ Swollen lymph nodes in your neck, armpits, or groin. ¨ A fever. Watch closely for changes in your health, and be sure to contact your doctor if: 
· You do not get better as expected. Where can you learn more? Go to http://edison-lucia.info/. Enter Q131 in the search box to learn more about \"Laparoscopic Hysterectomy: What to Expect at Home. \" Current as of: October 13, 2016 Content Version: 11.2 © 9741-2494 [employer]. Care instructions adapted under license by [employer] (which disclaims liability or warranty for this information).  If you have questions about a medical condition or this instruction, always ask your healthcare professional. [address] any warranty or liability for your use of this information. Laparoscopic Hysterectomy: Before Your Surgery What is a laparoscopic hysterectomy? A hysterectomy is surgery to take out the uterus. In some cases, the ovaries and fallopian tubes also are taken out at the same time. The doctor makes one or more small cuts in the belly. These cuts are called incisions. They let the doctor insert tools to do the surgery. One of these tools is a tube with a light on it. It's called a laparoscope, or scope. The scope and the other tools allow the doctor to free the uterus. The doctor then removes the uterus through the small cuts. In a total hysterectomy, the doctor takes out the uterus and the cervix. In a supracervical hysterectomy, only the uterus is taken out. Most women go home in 1 to 2 days. You may need about 4 to 6 weeks to fully recover. After the surgery, you will not have periods. You will not be able to get pregnant. If there is a chance that you will want to have a baby, talk to your doctor about other treatment options. Your doctor may advise you to take hormone pills if your ovaries are removed. Your doctor will talk to you about the risks and benefits of hormones. He or she will also tell you how long to take them. This surgery probably won't lower your interest in sex. In fact, some women enjoy sex more. This may be because they no longer have to worry about birth control or heavy bleeding. Follow-up care is a key part of your treatment and safety. Be sure to make and go to all appointments, and call your doctor if you are having problems. It's also a good idea to know your test results and keep a list of the medicines you take. What happens before surgery? Surgery can be stressful. This information will help you understand what you can expect. And it will help you safely prepare for surgery. Preparing for surgery · Understand exactly what surgery is planned, along with the risks, benefits, and other options. · Tell your doctors ALL the medicines, vitamins, supplements, and herbal remedies you take. Some of these can increase the risk of bleeding or interact with anesthesia. · If you take blood thinners, such as warfarin (Coumadin), clopidogrel (Plavix), or aspirin, be sure to talk to your doctor. He or she will tell you if you should stop taking these medicines before your surgery. Make sure that you understand exactly what your doctor wants you to do. · Your doctor will tell you which medicines to take or stop before your surgery. You may need to stop taking certain medicines a week or more before surgery. So talk to your doctor as soon as you can. · If you have an advance directive, let your doctor know. It may include a living will and a durable power of  for health care. Bring a copy to the hospital. If you don't have one, you may want to prepare one. It lets your doctor and loved ones know your health care wishes. Doctors advise that everyone prepare these papers before any type of surgery or procedure. What happens on the day of surgery? · Follow the instructions exactly about when to stop eating and drinking. If you don't, your surgery may be canceled. If your doctor told you to take your medicines on the day of surgery, take them with only a sip of water. · Take a bath or shower before you come in for your surgery. Do not apply lotions, perfumes, deodorants, or nail polish. · Do not shave the surgical site yourself. · Take off all jewelry and piercings. And take out contact lenses, if you wear them. At the hospital or surgery center · Bring a picture ID. · The area for surgery is often marked to make sure there are no errors. · You will be kept comfortable and safe by your anesthesia provider. You will be asleep during the surgery. · The surgery will take about 2 to 4 hours. Going home · Be sure you have someone to drive you home. Anesthesia and pain medicine make it unsafe for you to drive. · You will be given more specific instructions about recovering from your surgery. They will cover things like diet, wound care, follow-up care, driving, and getting back to your normal routine. When should you call your doctor? · You have questions or concerns. · You don't understand how to prepare for your surgery. · You become ill before the surgery (such as fever, flu, or a cold). · You need to reschedule or have changed your mind about having the surgery. Where can you learn more? Go to http://edisonDwellAwarelucia.info/. Enter D130 in the search box to learn more about \"Laparoscopic Hysterectomy: Before Your Surgery. \" Current as of: October 13, 2016 Content Version: 11.2 © 1909-5700 [employer]. Care instructions adapted under license by [employer] (which disclaims liability or warranty for this information). If you have questions about a medical condition or this instruction, always ask your healthcare professional. [address] any warranty or liability for your use of this information. Introducing [facility]! Gail Kumar introduces [employer] patient portal. Now you can access parts of your medical record, email your doctor's office, and request medication refills online. 1. In your internet browser, go to [email or web address] 2. Click on the First Time User? Click Here link in the Sign In box. You will see the New Member Sign Up page. 3. Enter your Omnidrive Access Code exactly as it appears below. You will not need to use this code after youve completed the sign-up process. If you do not sign up before the expiration date, you must request a new code. · Omnidrive Access Code: [email or web address] Expires: 6/13/2017  4:28 PM 
 
 4. Enter the last four digits of your Social Security Number (xxxx) and Date of Birth (mm/dd/yyyy) as indicated and click Submit. You will be taken to the next sign-up page. 5. Create a [employer] ID. This will be your [employer] login ID and cannot be changed, so think of one that is secure and easy to remember. 6. Create a [employer] password. You can change your password at any time. 7. Enter your Password Reset Question and Answer. This can be used at a later time if you forget your password. 8. Enter your e-mail address. You will receive e-mail notification when new information is available in [address] 9. Click Sign Up. You can now view and download portions of your medical record. 10. Click the Download Summary menu link to download a portable copy of your medical information. If you have questions, please visit the Frequently Asked Questions section of the [employer] website. Remember, [employer] is NOT to be used for urgent needs. For medical emergencies, dial 911. Now available from your iPhone and Android! Please provide this summary of care documentation to your next provider. Your primary care clinician is listed as [address]. If you have any questions after today's visit, please call [phone].

## 2017-03-29 NOTE — LETTER
NOTIFICATION RETURN TO WORK / SCHOOL 
 
3/29/2017 5:24 PM 
 
 
 
To Whom It May Concern: 
 
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 Please excuse Ms William Hoffman from work on : 3/15/17 and 3/29/17 If there are questions or concerns please have the patient contact our office.  
 
 
 
Sincerely, 
 
 
Alannah Christian MD

## 2017-03-29 NOTE — PROGRESS NOTES
[facility]  [facility], [address], [address] [facility]   [phone]  Keyshawn Zaldivar MD      Patient ID:  Name:  Dasha Vigil  MRN:  [id number]  :  1945/71 y.o. Date:  3/29/2017    REFERRING PROVIDER:  Freddie West MD    HISTORY OF PRESENT ILLNESS:  Dasha Vigil is a 70 y.o.   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 Patient has dementia, is nonverbal, not ambulatory, and has contractures. Constant vaginal bleeding has been a hygeine concern. PATHOLOGY:  3/15/17  UTERUS, CERVIX, BIOPSY:   CARCINOSARCOMA. GENETIC TESTING:  None to date    IMAGING:  Abdominal ultrasound 3/7/17 is scanned in media    COMPREHENSIVE ROS:  Endocrine: Diabetes and Thyroid Problems  All other systems reviewed and are negative.        PROBLEM LIST:                Patient Active Problem List    Diagnosis Date Noted    Subacute vaginitis 03/15/2017    Malignant neoplasm of uterus ([facility] 03/15/2017    Post-menopausal bleeding 03/15/2017    Dyslipidemia 2016    Late onset Alzheimer disease 2016    Type 2 diabetes mellitus ([facility] 2016    Pancreatic neoplasm 2012    Abnormal weight loss 10/10/2012    Disease of lung 2011    Chronic venous insufficiency 2011    Vitamin D deficiency 2011    Hypertension 2011    Peripheral arterial occlusive disease ([facility] 2011    Exomphalos 2011           Family Hx:                Family History   Problem Relation Age of Onset    Cancer Sister     Colon Cancer Nephew        Social Hx:                Social History   Substance Use Topics    Smoking status: Never Smoker    Smokeless tobacco: Never Used    Alcohol use No       Surgical Hx:                Past Surgical History:   Procedure Laterality Date    HX BACK SURGERY      pinched nerve    HX OTHER SURGICAL      abdominal surgery unknown specific possible gallbladder or appendix       Past Medical Hx:                Past Medical History:   Diagnosis Date    [email or web address] 2017    in process of being worked up, probable cancer diagnosis    Dementia     Diabetes ([email or web address] 1997    type 2    Hypercholesteremia     Hypertension     Ill-defined condition 2012    Alzheimers    PMB (postmenopausal bleeding)     Psychiatric disorder     Stroke ([email or web address] 2004, 2010    contracted left leg, non-ambulatory    Thyroid disease        Medications:     Current Outpatient Prescriptions   Medication Sig    omeprazole (PRILOSEC) 20 mg capsule Take 20 mg by mouth daily.  cholecalciferol (VITAMIN D3) 1,000 unit cap Take 125 Units by mouth daily.  amLODIPine (NORVASC) 5 mg tablet 5 mg daily.  clopidogrel (PLAVIX) 75 mg tab 75 mg daily.  cyanocobalamin 1,000 mcg tablet 1,000 mcg daily.  furosemide (LASIX) 40 mg tablet 20 mg.    hydrALAZINE (APRESOLINE) 50 mg tablet 50 mg three (3) times daily.  insulin glargine (LANTUS SOLOSTAR) 100 unit/mL (3 mL) pen 15 Units nightly.  levothyroxine (SYNTHROID) 25 mcg tablet 25 mcg daily.  metoprolol tartrate (LOPRESSOR) 25 mg tablet 25 mg two (2) times a day.  potassium chloride (K-DUR, KLOR-CON) 20 mEq tablet 20 mEq daily.  pravastatin (PRAVACHOL) 40 mg tablet 40 mg nightly.  sertraline (ZOLOFT) 25 mg tablet Take 25 mg by mouth daily.  QUEtiapine (SEROQUEL) 25 mg tablet 25 mg two (2) times a day.  valsartan (DIOVAN) 160 mg tablet 160 mg daily.  metroNIDAZOLE (FLAGYL) 500 mg tablet Take 1 Tab by mouth three (3) times daily for 14 days. No current facility-administered medications for this visit. Allergies:      Allergies   Allergen Reactions    Haloperidol Lactate Other (comments)    Lisinopril Unknown (comments)    Rosiglitazone Unknown (comments)         OBJECTIVE:    Physical Exam  VITAL SIGNS: Visit Vitals    /84    Pulse (!) 102    Temp 97.7 °F (36.5 °C) (Oral)    Resp 18 GENERAL RAF: in no apparent distress and well developed and well nourished   HEENT: NCAT,EOMI,PERRL   RESPIRATORY: lungs clear to auscultation, no wheezing, rhonchi, or crackles   CARDIOVASC: Regular rate and rhythm or without murmur or extra heart sounds   GASTROINT: soft, non-tender, non-distended, without masses or organomegaly, normal active bowel sounds   MUSCULOSKEL: no joint tenderness, deformity or swelling   INTEGUMENT:  warm and dry, no rashes or lesions   EXTREMITIES: extremities normal, atraumatic, no cyanosis or edema   PELVIC: External genitalia: normal general appearance  Urinary system: urethral meatus normal  Vaginal: atrophic mucosa and presence of blood  Cervix: deviated to the left with purulent, malodorous cervical drainage and blood from os. Papillary tumor is protruding from the cervical os. Biopsy of the prolapsing tumor was performed. Adnexa: non palpable  Uterus: irregular enlargement  Rectal: normal appearing anus   RECTAL: deferred   FLY SURVEY: Cervical, supraclavicular, axillary and inguinal nodes normal.   NEURO: Grossly normal         IMPRESSION/PLAN:  Uterine Carcinosarcoma with post menopausal bleeding and malodorous drainage. Vaginitis. Flagyl prescribed   Counseled family present with patient today regarding diagnosis options for treatment. Patient would not be able to have CT scan to evaluate for metastatic disease due to dementia and contractures. Discussed option of diagnostic laparoscopy with TLH/BSO which would be an option and helpful to palliate symptoms of vaginal bleeding. Family present today desire to proceed with surgery. PCP clearance requested. Arlen Blackwood.  Lorie RUBIN  Gynecologic Oncology  4/24/67700:66 PM

## 2017-03-30 ENCOUNTER — TELEPHONE (OUTPATIENT)
Dept: ONCOLOGY | Age: 72
End: 2017-03-30

## 2017-03-30 LAB
ATRIAL RATE: 92 BPM
CALCULATED P AXIS, ECG09: 40 DEGREES
CALCULATED R AXIS, ECG10: 110 DEGREES
CALCULATED T AXIS, ECG11: -13 DEGREES
DIAGNOSIS, 93000: NORMAL
P-R INTERVAL, ECG05: 190 MS
Q-T INTERVAL, ECG07: 418 MS
QRS DURATION, ECG06: 142 MS
QTC CALCULATION (BEZET), ECG08: 516 MS
VENTRICULAR RATE, ECG03: 92 BPM

## 2017-03-30 NOTE — CALL BACK NOTE
Spoke with Giovanny Dietrich at office - she requested I send labs to NP at PCP office for clearance

## 2017-03-30 NOTE — PERIOP NOTES
Faxed note from NP to [facility] in [place]. Magno Gilbert

## 2017-03-30 NOTE — TELEPHONE ENCOUNTER
Spoke w/ Lilia at [facility] office and she is not recommending for pt to have any surgery.

## 2017-03-30 NOTE — TELEPHONE ENCOUNTER
Phani Coffey from Pre op called to make us aware Pt's A1C and glucose is elevated. Phani Coffey is going to fax lab results and clearance form to Pt's PCP. Pt is scheduled for surgery 4/4/17.

## 2017-03-31 ENCOUNTER — TELEPHONE (OUTPATIENT)
Dept: ONCOLOGY | Age: 72
End: 2017-03-31

## 2017-03-31 NOTE — TELEPHONE ENCOUNTER
Eileen Galindo (daughter) called wanting to speak w/ the nurse in ref to pts surgery and why is needing to cancel surgery.  I informed her Dr. Rashaad Lao will need to speak to Jeni Dior and that I would get in contact her afterwards

## 2017-04-03 ENCOUNTER — DOCUMENTATION ONLY (OUTPATIENT)
Dept: ONCOLOGY | Age: 72
End: 2017-04-03

## 2017-04-03 NOTE — PROGRESS NOTES
Spoke with PCP Bryanna Yang NP and we agree to proceed with surgery for palliative reasons.    [facility]

## 2017-04-03 NOTE — TELEPHONE ENCOUNTER
Lesley Quezada called wanting to know what is the next step. Has Dr. Radha Paul spoken w/ Josiah Grimes?

## 2017-04-05 NOTE — TELEPHONE ENCOUNTER
Spoke w/ Janiya Enter and r/s surgery for 4/11/17 and would like to speak with you in ref to pre op instructions. Please call her back asap.  She was informed to arrive at the hospital at 830am. 2wk post op set up

## 2017-04-10 ENCOUNTER — ANESTHESIA EVENT (OUTPATIENT)
Dept: SURGERY | Age: 72
End: 2017-04-10
Payer: MEDICARE

## 2017-04-10 ENCOUNTER — TELEPHONE (OUTPATIENT)
Dept: ONCOLOGY | Age: 72
End: 2017-04-10

## 2017-04-11 ENCOUNTER — SURGERY (OUTPATIENT)
Age: 72
End: 2017-04-11

## 2017-04-11 ENCOUNTER — ANESTHESIA (OUTPATIENT)
Dept: SURGERY | Age: 72
End: 2017-04-11
Payer: MEDICARE

## 2017-04-11 ENCOUNTER — HOSPITAL ENCOUNTER (OUTPATIENT)
Age: 72
Setting detail: OBSERVATION
Discharge: HOME | End: 2017-04-13
Attending: OBSTETRICS & GYNECOLOGY | Admitting: OBSTETRICS & GYNECOLOGY
Payer: MEDICARE

## 2017-04-11 LAB
GLUCOSE BLD STRIP.AUTO-MCNC: 100 MG/DL (ref 70–110)
GLUCOSE BLD STRIP.AUTO-MCNC: 131 MG/DL (ref 70–110)
GLUCOSE BLD STRIP.AUTO-MCNC: 152 MG/DL (ref 70–110)

## 2017-04-11 PROCEDURE — 77030014650 HC SEAL MTRX FLOSEL BAXT -C: Performed by: OBSTETRICS & GYNECOLOGY

## 2017-04-11 PROCEDURE — 74011000258 HC RX REV CODE- 258: Performed by: OBSTETRICS & GYNECOLOGY

## 2017-04-11 PROCEDURE — 74011250636 HC RX REV CODE- 250/636

## 2017-04-11 PROCEDURE — 77030006643: Performed by: ANESTHESIOLOGY

## 2017-04-11 PROCEDURE — 77030010517 HC APPL SEAL FLOSEL BAXT -B: Performed by: OBSTETRICS & GYNECOLOGY

## 2017-04-11 PROCEDURE — 76210000016 HC OR PH I REC 1 TO 1.5 HR: Performed by: OBSTETRICS & GYNECOLOGY

## 2017-04-11 PROCEDURE — 77030018778 HC MANIP UTER VCAR CNMD -B: Performed by: OBSTETRICS & GYNECOLOGY

## 2017-04-11 PROCEDURE — 77030019927 HC TBNG IRR CYSTO BAXT -A: Performed by: OBSTETRICS & GYNECOLOGY

## 2017-04-11 PROCEDURE — 74011250636 HC RX REV CODE- 250/636: Performed by: OBSTETRICS & GYNECOLOGY

## 2017-04-11 PROCEDURE — 77030002968 HC SUT PDS LSIS -B: Performed by: OBSTETRICS & GYNECOLOGY

## 2017-04-11 PROCEDURE — 77030008477 HC STYL SATN SLP COVD -A: Performed by: ANESTHESIOLOGY

## 2017-04-11 PROCEDURE — 77030002903 HC SUT DEV PLCMNT LSIS -C: Performed by: OBSTETRICS & GYNECOLOGY

## 2017-04-11 PROCEDURE — 88333 PATH CONSLTJ SURG CYTO XM 1: CPT | Performed by: OBSTETRICS & GYNECOLOGY

## 2017-04-11 PROCEDURE — 77030002904 HC SUT DEV RNNG LSIS -C: Performed by: OBSTETRICS & GYNECOLOGY

## 2017-04-11 PROCEDURE — 74011000250 HC RX REV CODE- 250: Performed by: OBSTETRICS & GYNECOLOGY

## 2017-04-11 PROCEDURE — 88307 TISSUE EXAM BY PATHOLOGIST: CPT | Performed by: OBSTETRICS & GYNECOLOGY

## 2017-04-11 PROCEDURE — 82962 GLUCOSE BLOOD TEST: CPT

## 2017-04-11 PROCEDURE — 77030020246 HC SOL INJ D 10% LFCR 1000ML BG: Performed by: OBSTETRICS & GYNECOLOGY

## 2017-04-11 PROCEDURE — 77010033678 HC OXYGEN DAILY

## 2017-04-11 PROCEDURE — 77030005521 HC CATH URETH FOL38 BARD -B: Performed by: OBSTETRICS & GYNECOLOGY

## 2017-04-11 PROCEDURE — 77030031139 HC SUT VCRL2 J&J -A: Performed by: OBSTETRICS & GYNECOLOGY

## 2017-04-11 PROCEDURE — 74011000250 HC RX REV CODE- 250

## 2017-04-11 PROCEDURE — 77030016151 HC PROTCTR LNS DFOG COVD -B: Performed by: OBSTETRICS & GYNECOLOGY

## 2017-04-11 PROCEDURE — 77030037241 HC PRT ACC BLDLSS AIRSEAL CNMD -B: Performed by: OBSTETRICS & GYNECOLOGY

## 2017-04-11 PROCEDURE — 74011250636 HC RX REV CODE- 250/636: Performed by: ANESTHESIOLOGY

## 2017-04-11 PROCEDURE — 77030018835 HC SOL IRR LR ICUM -A: Performed by: OBSTETRICS & GYNECOLOGY

## 2017-04-11 PROCEDURE — 77030008603 HC TRCR ENDOSC EPATH J&J -C: Performed by: OBSTETRICS & GYNECOLOGY

## 2017-04-11 PROCEDURE — 77030011294 HC FCPS BPLR MCR J&J -B: Performed by: OBSTETRICS & GYNECOLOGY

## 2017-04-11 PROCEDURE — 74011636637 HC RX REV CODE- 636/637: Performed by: ANESTHESIOLOGY

## 2017-04-11 PROCEDURE — 77030010507 HC ADH SKN DERMBND J&J -B: Performed by: OBSTETRICS & GYNECOLOGY

## 2017-04-11 PROCEDURE — 77030008683 HC TU ET CUF COVD -A: Performed by: ANESTHESIOLOGY

## 2017-04-11 PROCEDURE — 88334 PATH CONSLTJ SURG CYTO XM EA: CPT | Performed by: OBSTETRICS & GYNECOLOGY

## 2017-04-11 PROCEDURE — 77030002882 HC SUT CART KNOT LSIS -B: Performed by: OBSTETRICS & GYNECOLOGY

## 2017-04-11 PROCEDURE — 88309 TISSUE EXAM BY PATHOLOGIST: CPT | Performed by: OBSTETRICS & GYNECOLOGY

## 2017-04-11 PROCEDURE — 76010000131 HC OR TIME 2 TO 2.5 HR: Performed by: OBSTETRICS & GYNECOLOGY

## 2017-04-11 PROCEDURE — 77030005546 HC CATH URETH FOL 3W BARD -A: Performed by: OBSTETRICS & GYNECOLOGY

## 2017-04-11 PROCEDURE — 77030018823 HC SLV COMPR VENO -B: Performed by: OBSTETRICS & GYNECOLOGY

## 2017-04-11 PROCEDURE — 76060000035 HC ANESTHESIA 2 TO 2.5 HR: Performed by: OBSTETRICS & GYNECOLOGY

## 2017-04-11 PROCEDURE — 77030002935 HC SUT MCRYL J&J -C: Performed by: OBSTETRICS & GYNECOLOGY

## 2017-04-11 PROCEDURE — 77030033639 HC SHR ENDO COAG HARM 36 J&J -E: Performed by: OBSTETRICS & GYNECOLOGY

## 2017-04-11 PROCEDURE — 99218 HC RM OBSERVATION: CPT

## 2017-04-11 PROCEDURE — C9113 INJ PANTOPRAZOLE SODIUM, VIA: HCPCS | Performed by: OBSTETRICS & GYNECOLOGY

## 2017-04-11 PROCEDURE — C1769 GUIDE WIRE: HCPCS | Performed by: OBSTETRICS & GYNECOLOGY

## 2017-04-11 PROCEDURE — 88112 CYTOPATH CELL ENHANCE TECH: CPT | Performed by: OBSTETRICS & GYNECOLOGY

## 2017-04-11 PROCEDURE — 74011250637 HC RX REV CODE- 250/637: Performed by: OBSTETRICS & GYNECOLOGY

## 2017-04-11 PROCEDURE — 77030018673: Performed by: OBSTETRICS & GYNECOLOGY

## 2017-04-11 PROCEDURE — 77030011640 HC PAD GRND REM COVD -A: Performed by: OBSTETRICS & GYNECOLOGY

## 2017-04-11 RX ORDER — PROPOFOL 10 MG/ML
INJECTION, EMULSION INTRAVENOUS AS NEEDED
Status: DISCONTINUED | OUTPATIENT
Start: 2017-04-11 | End: 2017-04-11 | Stop reason: HOSPADM

## 2017-04-11 RX ORDER — DEXTROSE 50 % IN WATER (D50W) INTRAVENOUS SYRINGE
25-50 AS NEEDED
Status: DISCONTINUED | OUTPATIENT
Start: 2017-04-11 | End: 2017-04-11 | Stop reason: HOSPADM

## 2017-04-11 RX ORDER — ROCURONIUM BROMIDE 10 MG/ML
INJECTION, SOLUTION INTRAVENOUS AS NEEDED
Status: DISCONTINUED | OUTPATIENT
Start: 2017-04-11 | End: 2017-04-11 | Stop reason: HOSPADM

## 2017-04-11 RX ORDER — ALBUTEROL SULFATE 0.83 MG/ML
2.5 SOLUTION RESPIRATORY (INHALATION) AS NEEDED
Status: DISCONTINUED | OUTPATIENT
Start: 2017-04-11 | End: 2017-04-11 | Stop reason: HOSPADM

## 2017-04-11 RX ORDER — SODIUM CHLORIDE 9 MG/ML
125 INJECTION, SOLUTION INTRAVENOUS CONTINUOUS
Status: DISCONTINUED | OUTPATIENT
Start: 2017-04-11 | End: 2017-04-13 | Stop reason: HOSPADM

## 2017-04-11 RX ORDER — SODIUM CHLORIDE, SODIUM LACTATE, POTASSIUM CHLORIDE, CALCIUM CHLORIDE 600; 310; 30; 20 MG/100ML; MG/100ML; MG/100ML; MG/100ML
50 INJECTION, SOLUTION INTRAVENOUS CONTINUOUS
Status: DISCONTINUED | OUTPATIENT
Start: 2017-04-11 | End: 2017-04-11

## 2017-04-11 RX ORDER — ACETAMINOPHEN 10 MG/ML
800 INJECTION, SOLUTION INTRAVENOUS ONCE
Status: COMPLETED | OUTPATIENT
Start: 2017-04-11 | End: 2017-04-11

## 2017-04-11 RX ORDER — VALSARTAN 160 MG/1
160 TABLET ORAL DAILY
Status: DISCONTINUED | OUTPATIENT
Start: 2017-04-12 | End: 2017-04-13 | Stop reason: HOSPADM

## 2017-04-11 RX ORDER — SERTRALINE HYDROCHLORIDE 50 MG/1
25 TABLET, FILM COATED ORAL DAILY
Status: DISCONTINUED | OUTPATIENT
Start: 2017-04-12 | End: 2017-04-13 | Stop reason: HOSPADM

## 2017-04-11 RX ORDER — FENTANYL CITRATE 50 UG/ML
INJECTION, SOLUTION INTRAMUSCULAR; INTRAVENOUS AS NEEDED
Status: DISCONTINUED | OUTPATIENT
Start: 2017-04-11 | End: 2017-04-11 | Stop reason: HOSPADM

## 2017-04-11 RX ORDER — KETAMINE HYDROCHLORIDE 10 MG/ML
INJECTION, SOLUTION INTRAMUSCULAR; INTRAVENOUS AS NEEDED
Status: DISCONTINUED | OUTPATIENT
Start: 2017-04-11 | End: 2017-04-11 | Stop reason: HOSPADM

## 2017-04-11 RX ORDER — PRAVASTATIN SODIUM 20 MG/1
40 TABLET ORAL
Status: DISCONTINUED | OUTPATIENT
Start: 2017-04-11 | End: 2017-04-13 | Stop reason: HOSPADM

## 2017-04-11 RX ORDER — FUROSEMIDE 20 MG/1
20 TABLET ORAL DAILY
Status: DISCONTINUED | OUTPATIENT
Start: 2017-04-12 | End: 2017-04-13 | Stop reason: HOSPADM

## 2017-04-11 RX ORDER — AMLODIPINE BESYLATE 5 MG/1
5 TABLET ORAL DAILY
Status: DISCONTINUED | OUTPATIENT
Start: 2017-04-12 | End: 2017-04-13 | Stop reason: HOSPADM

## 2017-04-11 RX ORDER — METOPROLOL TARTRATE 50 MG/1
50 TABLET ORAL 2 TIMES DAILY
Status: DISCONTINUED | OUTPATIENT
Start: 2017-04-11 | End: 2017-04-13 | Stop reason: HOSPADM

## 2017-04-11 RX ORDER — SODIUM CHLORIDE, SODIUM LACTATE, POTASSIUM CHLORIDE, CALCIUM CHLORIDE 600; 310; 30; 20 MG/100ML; MG/100ML; MG/100ML; MG/100ML
INJECTION, SOLUTION INTRAVENOUS
Status: DISCONTINUED | OUTPATIENT
Start: 2017-04-11 | End: 2017-04-11 | Stop reason: HOSPADM

## 2017-04-11 RX ORDER — SODIUM CHLORIDE, SODIUM LACTATE, POTASSIUM CHLORIDE, CALCIUM CHLORIDE 600; 310; 30; 20 MG/100ML; MG/100ML; MG/100ML; MG/100ML
150 INJECTION, SOLUTION INTRAVENOUS CONTINUOUS
Status: DISCONTINUED | OUTPATIENT
Start: 2017-04-11 | End: 2017-04-11 | Stop reason: HOSPADM

## 2017-04-11 RX ORDER — SODIUM CHLORIDE 0.9 % (FLUSH) 0.9 %
5-10 SYRINGE (ML) INJECTION AS NEEDED
Status: DISCONTINUED | OUTPATIENT
Start: 2017-04-11 | End: 2017-04-11 | Stop reason: HOSPADM

## 2017-04-11 RX ORDER — ONDANSETRON 2 MG/ML
INJECTION INTRAMUSCULAR; INTRAVENOUS AS NEEDED
Status: DISCONTINUED | OUTPATIENT
Start: 2017-04-11 | End: 2017-04-11 | Stop reason: HOSPADM

## 2017-04-11 RX ORDER — NEOSTIGMINE METHYLSULFATE 5 MG/5 ML
SYRINGE (ML) INTRAVENOUS AS NEEDED
Status: DISCONTINUED | OUTPATIENT
Start: 2017-04-11 | End: 2017-04-11 | Stop reason: HOSPADM

## 2017-04-11 RX ORDER — GLYCOPYRROLATE 0.2 MG/ML
INJECTION INTRAMUSCULAR; INTRAVENOUS AS NEEDED
Status: DISCONTINUED | OUTPATIENT
Start: 2017-04-11 | End: 2017-04-11 | Stop reason: HOSPADM

## 2017-04-11 RX ORDER — LEVOTHYROXINE SODIUM 25 UG/1
25 TABLET ORAL DAILY
Status: DISCONTINUED | OUTPATIENT
Start: 2017-04-12 | End: 2017-04-13 | Stop reason: HOSPADM

## 2017-04-11 RX ORDER — QUETIAPINE FUMARATE 25 MG/1
25 TABLET, FILM COATED ORAL 2 TIMES DAILY
Status: DISCONTINUED | OUTPATIENT
Start: 2017-04-11 | End: 2017-04-13 | Stop reason: HOSPADM

## 2017-04-11 RX ORDER — HYDROCODONE BITARTRATE AND ACETAMINOPHEN 5; 325 MG/1; MG/1
1 TABLET ORAL AS NEEDED
Status: DISCONTINUED | OUTPATIENT
Start: 2017-04-11 | End: 2017-04-11 | Stop reason: HOSPADM

## 2017-04-11 RX ORDER — PHENAZOPYRIDINE HYDROCHLORIDE 100 MG/1
100 TABLET, FILM COATED ORAL
Status: DISCONTINUED | OUTPATIENT
Start: 2017-04-11 | End: 2017-04-11

## 2017-04-11 RX ORDER — INSULIN LISPRO 100 [IU]/ML
INJECTION, SOLUTION INTRAVENOUS; SUBCUTANEOUS ONCE
Status: COMPLETED | OUTPATIENT
Start: 2017-04-11 | End: 2017-04-11

## 2017-04-11 RX ORDER — POTASSIUM CHLORIDE 20 MEQ/1
20 TABLET, EXTENDED RELEASE ORAL DAILY
Status: DISCONTINUED | OUTPATIENT
Start: 2017-04-12 | End: 2017-04-13 | Stop reason: HOSPADM

## 2017-04-11 RX ORDER — DIPHENHYDRAMINE HYDROCHLORIDE 50 MG/ML
12.5 INJECTION, SOLUTION INTRAMUSCULAR; INTRAVENOUS
Status: DISCONTINUED | OUTPATIENT
Start: 2017-04-11 | End: 2017-04-11 | Stop reason: HOSPADM

## 2017-04-11 RX ORDER — SODIUM CHLORIDE 0.9 % (FLUSH) 0.9 %
5-10 SYRINGE (ML) INJECTION AS NEEDED
Status: DISCONTINUED | OUTPATIENT
Start: 2017-04-11 | End: 2017-04-13 | Stop reason: HOSPADM

## 2017-04-11 RX ORDER — DIPHENHYDRAMINE HYDROCHLORIDE 50 MG/ML
12.5 INJECTION, SOLUTION INTRAMUSCULAR; INTRAVENOUS
Status: DISCONTINUED | OUTPATIENT
Start: 2017-04-11 | End: 2017-04-12

## 2017-04-11 RX ORDER — HYDROMORPHONE HYDROCHLORIDE 1 MG/ML
0.5 INJECTION, SOLUTION INTRAMUSCULAR; INTRAVENOUS; SUBCUTANEOUS
Status: DISCONTINUED | OUTPATIENT
Start: 2017-04-11 | End: 2017-04-12

## 2017-04-11 RX ORDER — MAGNESIUM SULFATE 100 %
4 CRYSTALS MISCELLANEOUS AS NEEDED
Status: DISCONTINUED | OUTPATIENT
Start: 2017-04-11 | End: 2017-04-11 | Stop reason: HOSPADM

## 2017-04-11 RX ORDER — HYDRALAZINE HYDROCHLORIDE 25 MG/1
50 TABLET, FILM COATED ORAL 3 TIMES DAILY
Status: DISCONTINUED | OUTPATIENT
Start: 2017-04-11 | End: 2017-04-12

## 2017-04-11 RX ORDER — ENOXAPARIN SODIUM 100 MG/ML
40 INJECTION SUBCUTANEOUS DAILY
Status: DISCONTINUED | OUTPATIENT
Start: 2017-04-11 | End: 2017-04-13 | Stop reason: HOSPADM

## 2017-04-11 RX ORDER — ONDANSETRON 2 MG/ML
4 INJECTION INTRAMUSCULAR; INTRAVENOUS
Status: DISCONTINUED | OUTPATIENT
Start: 2017-04-11 | End: 2017-04-13 | Stop reason: HOSPADM

## 2017-04-11 RX ORDER — HYDROMORPHONE HYDROCHLORIDE 1 MG/ML
0.5 INJECTION, SOLUTION INTRAMUSCULAR; INTRAVENOUS; SUBCUTANEOUS
Status: DISCONTINUED | OUTPATIENT
Start: 2017-04-11 | End: 2017-04-11 | Stop reason: HOSPADM

## 2017-04-11 RX ORDER — NALOXONE HYDROCHLORIDE 0.4 MG/ML
0.1 INJECTION, SOLUTION INTRAMUSCULAR; INTRAVENOUS; SUBCUTANEOUS AS NEEDED
Status: DISCONTINUED | OUTPATIENT
Start: 2017-04-11 | End: 2017-04-11 | Stop reason: HOSPADM

## 2017-04-11 RX ORDER — FUROSEMIDE 10 MG/ML
INJECTION INTRAMUSCULAR; INTRAVENOUS AS NEEDED
Status: DISCONTINUED | OUTPATIENT
Start: 2017-04-11 | End: 2017-04-11 | Stop reason: HOSPADM

## 2017-04-11 RX ORDER — ONDANSETRON 2 MG/ML
4 INJECTION INTRAMUSCULAR; INTRAVENOUS ONCE
Status: DISCONTINUED | OUTPATIENT
Start: 2017-04-11 | End: 2017-04-11 | Stop reason: HOSPADM

## 2017-04-11 RX ORDER — SODIUM CHLORIDE 0.9 % (FLUSH) 0.9 %
5-10 SYRINGE (ML) INJECTION EVERY 8 HOURS
Status: DISCONTINUED | OUTPATIENT
Start: 2017-04-11 | End: 2017-04-13 | Stop reason: HOSPADM

## 2017-04-11 RX ORDER — KETOROLAC TROMETHAMINE 30 MG/ML
15 INJECTION, SOLUTION INTRAMUSCULAR; INTRAVENOUS
Status: ACTIVE | OUTPATIENT
Start: 2017-04-11 | End: 2017-04-12

## 2017-04-11 RX ADMIN — SODIUM CHLORIDE 125 ML/HR: 900 INJECTION, SOLUTION INTRAVENOUS at 17:04

## 2017-04-11 RX ADMIN — ONDANSETRON 4 MG: 2 INJECTION INTRAMUSCULAR; INTRAVENOUS at 11:57

## 2017-04-11 RX ADMIN — FENTANYL CITRATE 75 MCG: 50 INJECTION, SOLUTION INTRAMUSCULAR; INTRAVENOUS at 11:21

## 2017-04-11 RX ADMIN — ENOXAPARIN SODIUM 40 MG: 40 INJECTION SUBCUTANEOUS at 17:35

## 2017-04-11 RX ADMIN — KETAMINE HYDROCHLORIDE 30 MG: 10 INJECTION, SOLUTION INTRAMUSCULAR; INTRAVENOUS at 11:21

## 2017-04-11 RX ADMIN — SODIUM CHLORIDE, SODIUM LACTATE, POTASSIUM CHLORIDE, CALCIUM CHLORIDE: 600; 310; 30; 20 INJECTION, SOLUTION INTRAVENOUS at 11:41

## 2017-04-11 RX ADMIN — PROPOFOL 50 MG: 10 INJECTION, EMULSION INTRAVENOUS at 11:21

## 2017-04-11 RX ADMIN — INSULIN LISPRO 3 UNITS: 100 INJECTION, SOLUTION INTRAVENOUS; SUBCUTANEOUS at 15:28

## 2017-04-11 RX ADMIN — FENTANYL CITRATE 50 MCG: 50 INJECTION, SOLUTION INTRAMUSCULAR; INTRAVENOUS at 12:20

## 2017-04-11 RX ADMIN — FUROSEMIDE 10 MG: 10 INJECTION INTRAMUSCULAR; INTRAVENOUS at 13:14

## 2017-04-11 RX ADMIN — HYDRALAZINE HYDROCHLORIDE 50 MG: 25 TABLET, FILM COATED ORAL at 17:35

## 2017-04-11 RX ADMIN — CEFOXITIN 2 G: 2 INJECTION, POWDER, FOR SOLUTION INTRAVENOUS at 17:35

## 2017-04-11 RX ADMIN — GLYCOPYRROLATE 0.4 MG: 0.2 INJECTION INTRAMUSCULAR; INTRAVENOUS at 13:09

## 2017-04-11 RX ADMIN — SODIUM CHLORIDE, SODIUM LACTATE, POTASSIUM CHLORIDE, AND CALCIUM CHLORIDE: 600; 310; 30; 20 INJECTION, SOLUTION INTRAVENOUS at 13:29

## 2017-04-11 RX ADMIN — FENTANYL CITRATE 25 MCG: 50 INJECTION, SOLUTION INTRAMUSCULAR; INTRAVENOUS at 12:17

## 2017-04-11 RX ADMIN — ROCURONIUM BROMIDE 40 MG: 10 INJECTION, SOLUTION INTRAVENOUS at 11:22

## 2017-04-11 RX ADMIN — CEFOXITIN 2 G: 2 INJECTION, POWDER, FOR SOLUTION INTRAVENOUS at 11:46

## 2017-04-11 RX ADMIN — Medication 3 MG: at 13:09

## 2017-04-11 RX ADMIN — ACETAMINOPHEN 1000 MG: 10 INJECTION, SOLUTION INTRAVENOUS at 11:57

## 2017-04-11 RX ADMIN — SODIUM CHLORIDE, SODIUM LACTATE, POTASSIUM CHLORIDE, AND CALCIUM CHLORIDE 50 ML/HR: 600; 310; 30; 20 INJECTION, SOLUTION INTRAVENOUS at 10:35

## 2017-04-11 RX ADMIN — BUPIVACAINE HYDROCHLORIDE AND EPINEPHRINE BITARTRATE 23 ML: 5; .0091 INJECTION, SOLUTION EPIDURAL; INTRACAUDAL; PERINEURAL at 12:40

## 2017-04-11 RX ADMIN — SODIUM CHLORIDE, SODIUM LACTATE, POTASSIUM CHLORIDE, AND CALCIUM CHLORIDE 50 ML/HR: 600; 310; 30; 20 INJECTION, SOLUTION INTRAVENOUS at 15:28

## 2017-04-11 RX ADMIN — SODIUM CHLORIDE 40 MG: 9 INJECTION INTRAMUSCULAR; INTRAVENOUS; SUBCUTANEOUS at 14:36

## 2017-04-11 NOTE — H&P
[facility]  [address] [place] [address], [address], [address] [facility]   [phone]  [address] Yehuda Carlson MD        Patient ID:  Name:  August Gallegos  MRN:  [id number]  :  1945/71 y.o. Date:  17     REFERRING PROVIDER:  Mikeal Opitz MD     HISTORY OF PRESENT ILLNESS:  August Gallegos is a 70 y.o.   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 Patient has dementia, is nonverbal, not ambulatory, and has contractures.  Constant vaginal bleeding and malodorous vaginal drainage from necrotic tumor has been a hygeine concern.        PATHOLOGY:  3/15/17  UTERUS, CERVIX, BIOPSY:   CARCINOSARCOMA.      GENETIC TESTING:  None to date     IMAGING:  Abdominal ultrasound 3/7/17 is scanned in media     COMPREHENSIVE ROS:  Endocrine: Diabetes and Thyroid Problems  All other systems reviewed and are negative.         PROBLEM LIST:           Patient Active Problem List     Diagnosis Date Noted    Subacute vaginitis 03/15/2017    Malignant neoplasm of uterus ([facility] [address] 03/15/2017    Post-menopausal bleeding 03/15/2017    Dyslipidemia 2016    Late onset Alzheimer disease 2016    Type 2 diabetes mellitus ([address] 2016    Pancreatic neoplasm 2012    Abnormal weight loss 10/10/2012    Disease of lung 2011    Chronic venous insufficiency 2011    Vitamin D deficiency 2011    Hypertension 2011    Peripheral arterial occlusive disease ([facility] [address] [facility] 2011    Exomphalos 2011               Family Hx:            Family History   Problem Relation Age of Onset    Cancer Sister      Colon Cancer Nephew           Social Hx:           Social History   Substance Use Topics    Smoking status: Never Smoker    Smokeless tobacco: Never Used    Alcohol use No         Surgical Hx:            Past Surgical History:   Procedure Laterality Date    HX BACK SURGERY         pinched nerve  HX OTHER SURGICAL         abdominal surgery unknown specific possible gallbladder or appendix         Past Medical Hx:           Past Medical History:   Diagnosis Date    Cancer ([facility] 2017     in process of being worked up, probable cancer diagnosis    Dementia      Diabetes ([facility] 1997     type 2    Hypercholesteremia      Hypertension      Ill-defined condition 2012     Alzheimers    PMB (postmenopausal bleeding)      Psychiatric disorder      Stroke ([facility] 2004, 2010     contracted left leg, non-ambulatory    Thyroid disease           Medications:           Current Outpatient Prescriptions   Medication Sig    omeprazole (PRILOSEC) 20 mg capsule Take 20 mg by mouth daily.  cholecalciferol (VITAMIN D3) 1,000 unit cap Take 125 Units by mouth daily.  amLODIPine (NORVASC) 5 mg tablet 5 mg daily.  clopidogrel (PLAVIX) 75 mg tab 75 mg daily.  cyanocobalamin 1,000 mcg tablet 1,000 mcg daily.  furosemide (LASIX) 40 mg tablet 20 mg.    hydrALAZINE (APRESOLINE) 50 mg tablet 50 mg three (3) times daily.  insulin glargine (LANTUS SOLOSTAR) 100 unit/mL (3 mL) pen 15 Units nightly.  levothyroxine (SYNTHROID) 25 mcg tablet 25 mcg daily.  metoprolol tartrate (LOPRESSOR) 25 mg tablet 25 mg two (2) times a day.  potassium chloride (K-DUR, KLOR-CON) 20 mEq tablet 20 mEq daily.  pravastatin (PRAVACHOL) 40 mg tablet 40 mg nightly.  sertraline (ZOLOFT) 25 mg tablet Take 25 mg by mouth daily.  QUEtiapine (SEROQUEL) 25 mg tablet 25 mg two (2) times a day.  valsartan (DIOVAN) 160 mg tablet 160 mg daily.     metroNIDAZOLE (FLAGYL) 500 mg tablet Take 1 Tab by mouth three (3) times daily for 14 days.      No current facility-administered medications for this visit.          Allergies:           Allergies   Allergen Reactions    Haloperidol Lactate Other (comments)    Lisinopril Unknown (comments)    Rosiglitazone Unknown (comments)            OBJECTIVE:     Physical Exam  Visit Vitals  /64 (BP 1 Location: Left arm, BP Patient Position: At rest;Pre-activity; Sitting)    Pulse (!) 101    Temp 96.4 °F (35.8 °C)    Resp 15    Wt 55.2 kg (121 lb 11.2 oz)    SpO2 99%    BMI 23.77 kg/m2     GENERAL RAF: in no apparent distress and well developed and well nourished   HEENT: NCAT,EOMI,PERRL   RESPIRATORY: lungs clear to auscultation, no wheezing, rhonchi, or crackles   CARDIOVASC: Regular rate and rhythm or without murmur or extra heart sounds   GASTROINT: soft, non-tender, non-distended, without masses or organomegaly, normal active bowel sounds   MUSCULOSKEL: no joint tenderness, deformity or swelling   INTEGUMENT: warm and dry, no rashes or lesions   EXTREMITIES: extremities normal, atraumatic, no cyanosis or edema   PELVIC: External genitalia: normal general appearance  Urinary system: urethral meatus normal  Vaginal: atrophic mucosa and presence of blood  Cervix: deviated to the left with purulent, malodorous cervical drainage and blood from os. Papillary tumor is protruding from the cervical os. Biopsy of the prolapsing tumor was performed. Adnexa: non palpable  Uterus: irregular enlargement  Rectal: normal appearing anus   RECTAL: deferred   FLY SURVEY: Cervical, supraclavicular, axillary and inguinal nodes normal.   NEURO: Grossly normal            IMPRESSION/PLAN:  Uterine Carcinosarcoma with post menopausal bleeding and malodorous drainage. Vaginitis. Flagyl prescribed. Counseled family present with patient regarding diagnosis and options for treatment. Recommended diagnostic laparoscopy with TLH/BSO to palliate symptoms of vaginal bleeding, malodorous drainage, and decrease tumor volume. Family expressed understanding of all information provided and acknowledges risk versus benefit of proposed surgery and desires to proceed with surgery. Discussed in detail with PCP Reynold Dey. Patient's medical co-morbidities are optimized.  PCP endorsed proceeding with surgery for palliation. Scheduled for surgery today.         Jones Melo MD  Gynecologic Oncology

## 2017-04-11 NOTE — PERIOP NOTES
Dr. Devaughn Bennett at bedside to observe pt.  Dr Devaughn Bennett aware of respiratiosn, stated that she is at baseline

## 2017-04-11 NOTE — OP NOTES
Gynecologic Oncology Operative Report    Harpal Simms    Date of Surgery: 2017    Pre-operative dx:   1) Uterine Carcinosarcoma  2) Heavy vaginal bleeding  3) Vaginitis   4) Cystitis     Post-operative dx:   1) Uterine Carcinosarcoma  2) Heavy vaginal bleeding  3) Vaginitis   4) Cystitis     Procedure:  1) Total laparoscopic hysterectomy, Bilateral salpingo-oophorectomy     2) Cystoscopy     Surgeon: Janiya Melo MD     Assistant: Brandon Lin SA    Anesthesia: General    Anesthesia Provider: Anesthesiologist: Boris Martinez MD  CRNA: Tri Paz CRNA    EBL: 50 cc     Complications: None     Specimens: uterus, cervix, fallopian tubes, ovaries, pelvic washings     Operative indications: Ty Wong is a 70 y.o.   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 Patient has dementia, is nonverbal, not ambulatory, and has contractures. Constant vaginal bleeding and malodorous vaginal drainage from necrotic tumor has been a hygeine concern. Hysterectomy was offered for resection of primary malignancy and palliation of symptoms. Operative findings: Enlarged irregular uterus. No evidence of disease outside of the uterus. Normal appearing ovaries and fallopian tubes. Cystoscopy showed cystitis and a large amount of sediment present in the bladder. The urethra, urethral mucosa, bladder and bladder mucosa was normal and there were active jets of urine seen from the right and left ureteral orifices. Procedure in detail: After the risks, benefits, indications, and alternatives of the procedure were discussed, patient was accurately identified and all questions were answered, the patient was taken to the operating room. She was placed in the supine position, sequential compression devices were placed on the bilateral lower extremities and were functioning prior to induction of anesthesia.   Mefoxin 2 grams was given intravenously as prophylactic antibiotics within one hour prior to incision. Costa catheter was placed in the bladder to gravity drainage. Patient underwent dosing of general anesthesia, was placed in the low dorsal lithotomy position in [address] [place] [address] and was prepped and draped in the usual fashion using Betadine. A Graves speculum was placed in the vagina, the cervix was visualized, a 0 Vicryl stitch was placed on the anterior lip of the cervix, the cervix was dilated to accommodate the medium V-care uterine manipulator which was easily passed through the cervix, the intrauterine bulb was filled with saline, the cervical cap was positioned over the cervix and locked into place. 0.5% marcaine with epinephrine was then injected into the skin prior to incisions. A 5mm skin incision was made in the left upper quadrant and a 5 mm blade-less, tissue  atraumatic trochar was directly inserted into the peritoneal cavity with the camera in position to visualize safe entry. Once proper placement was confirmed, the abdomen was insufflated with carbon dioxide gas. A 5 mm blade-less trochar was then inserted in each lower quadrant, midway between the anterior superior iliac spine and the umbilicus and inferior to the umbilicus. These trochars were inserted under direct visualization to ensure safe entry. The abdomen and pelvis were then examined, with the above mentioned findings noted. Pelvic washings were obtained. The patient was then placed in Trendelenburg. The left round ligament was identified, then coagulated and transected with the Harmonic Scalpel, allowing entry into the retroperitoneal space. The vesico-uterine peritoneum was divided with the Harmonic Scalpel from the left side across the midline and the bladder was sharply dissected off of the uterus and vagina allowing visualization of the ring of the V-Care manipulator anteriorly. We then identified the left ureter and bluntly developed the perirectal space.  The left uterine artery was identified at its origin off of the hyogastric artery, and it was coagulated and transected with the Harmonic Scalpel at that point, with the ureter being held on traction medially to ensure its safety. The left ovarian vessels were then isolated and then coagulated and transected with the Harmonic Scalpel. The posterior leaf of the broad ligament was then divided with the Harmonic Scalpel up to the level of the uterine body. We then directed our attention to the right side of the pelvis. The right round ligament was identified and then coagulated and transected with the Harmonic Scalpel, allowing entry into the retroperitoneal space. The remaining vesico-uterine peritoneum was then divided with the Harmonic Scalpel on the right side. The right ureter was then identified and the perirectal space was bluntly developed. The right uterine artery was then identified at its origin off of the hypogastric artery. It was coagulated and transected with the Harmonic Scalpel at that point, with the ureter being held on traction medially to ensure its safety. The right ovarian vessels were then isolated and then coagulated and transected with the Harmonic Scalpel. The posterior leaf of the broad ligament was then divided with the Harmonic Scalpel up to the level of the uterine body. We then moved anteriorly and the bladder was sharply dissected off of the lower uterine segment and cervix until it was well below the ring of the [employer]. The uterine arteries were then skeletonized bilaterally and then coagulated and transected with the Harmonic Scalpel at the level of the V-Care ring. A circumferential colpotomy was then performed by using the active blade of the Harmonic Scalpel to cut down onto the V-Care ring. Once the colpotomy was completed, the specimen was delivered transvaginally and sent to pathology. A blue towel was then placed into the vagina to maintain pneumoperitoneum.  The vaginal cuff was then reapproximated with four mattress stitches of 2-0 PDS suture using the LSI RD-180 suturing device. The stitches were secured in place with the LSI Ti-Knot device. Excellent reapproximation and hemostasis was noted. A cystoscopy was then performed. A 70 degree cystoscope with a 17 Georgian sheath was easily passed through the urethra. The urethral mucosa was visualized, the bladder mucosa was visualized, active jets of urine was seen from the right and left ureteral orifices. There was a large amount of sediment in the bladder. A 3 way orellana catheter was placed for bladder irrigation. The pelvis was then irrigated and all pedicles were re-inspected. Once satisfied with hemostasis, the gas was allowed to escape from the abdomen and the trochars were removed. The incision sites were closed with a stitch of 4-0 Monocryl, followed by a layer of Dermabond. The patient was taken out of stirrups, awakened from anesthesia, and taken to the recovery room in stable condition. All instrument, sponge, and needle counts were correct.      Qiana Chowdhury MD  4/11/2017  2:03 PM

## 2017-04-11 NOTE — IP AVS SNAPSHOT
Floydene Sons 
 
 
 [address] [facility] [address] 
[phone] Patient: Alonzo Clemente MRN: [id number] You are allergic to the following Allergen Reactions Haloperidol Lactate Other (comments) Lisinopril Unknown (comments) Rosiglitazone Unknown (comments) Recent Documentation Weight Breastfeeding? BMI OB Status Smoking Status 63.6 kg No 27.4 kg/m2 Postmenopausal Never Smoker Emergency Contacts Name Discharge Info Relation Home Work Mobile Jennifer Webb DISCHARGE CAREGIVER [3] Daughter [21] [phone] About your hospitalization You were admitted on:  April 11, 2017 You last received care in the:  [address] [place] [address] You were discharged on:  April 13, 2017 Unit phone number:  [phone] Why you were hospitalized Your primary diagnosis was:  Not on File Providers Seen During Your Hospitalizations Provider Role Specialty Primary office phone [address], [place] Attending Provider Gynecologic Oncology [phone] Your Primary Care Physician (PCP) Primary Care Physician Office Phone Office Fax [address] [address] [phone] Follow-up Information Follow up With Details Comments Contact Info Shawna Emanuel NP   [address] [facility] [address] [address] 
[phone] 2211 Bianka Frances MD On 4/28/2017 Follow up appointment scheduled for April 28, 2017 at 1:15 p.m. [address] [place] [address] [address] [address] 
[phone] Your Appointments Friday April 28, 2017  1:15 PM EDT  
POST OP with 2211 MD Kaia Dong [facility])  
 [address] [place] [address]  
[phone] Current Discharge Medication List  
  
 START taking these medications Dose & Instructions Dispensing Information Comments Morning Noon Evening Bedtime HYDROmorphone 2 mg tablet Commonly known as:  DILAUDID Your last dose was: Your next dose is:    
   
   
 Dose:  2 mg Take 1 Tab by mouth every four (4) hours as needed for Pain. Max Daily Amount: 12 mg. Indications: Pain Quantity:  60 Tab Refills:  0 CONTINUE these medications which have NOT CHANGED Dose & Instructions Dispensing Information Comments Morning Noon Evening Bedtime  
 amLODIPine 5 mg tablet Commonly known as:  Marti Fast Your last dose was: Your next dose is:    
   
   
 Dose:  5 mg  
5 mg daily. Refills:  0  
     
   
   
   
  
 clopidogrel 75 mg Tab Commonly known as:  PLAVIX Your last dose was: Your next dose is:    
   
   
 Dose:  75 mg  
75 mg daily. Refills:  0  
     
   
   
   
  
 cyanocobalamin 1,000 mcg tablet Your last dose was: Your next dose is:    
   
   
 Dose:  1000 mcg  
1,000 mcg daily. Refills:  0  
     
   
   
   
  
 furosemide 40 mg tablet Commonly known as:  LASIX Your last dose was: Your next dose is:    
   
   
 Dose:  20 mg  
20 mg. Refills:  0  
     
   
   
   
  
 hydrALAZINE 50 mg tablet Commonly known as:  APRESOLINE Your last dose was: Your next dose is:    
   
   
 Dose:  50 mg  
50 mg three (3) times daily. Refills:  0  
     
   
   
   
  
 insulin glargine 100 unit/mL (3 mL) pen Commonly known as:  LANTUS SOLOSTAR Your last dose was: Your next dose is:    
   
   
 Dose:  15 Units 15 Units nightly. Refills:  0  
     
   
   
   
  
 levothyroxine 25 mcg tablet Commonly known as:  SYNTHROID Your last dose was: Your next dose is:    
   
   
 Dose:  25 mcg 25 mcg daily. Refills:  0  
     
   
   
   
  
 metoprolol tartrate 25 mg tablet Commonly known as:  LOPRESSOR Your last dose was: Your next dose is:    
   
   
 Dose:  25 mg  
25 mg two (2) times a day. Refills:  0  
     
   
   
   
  
 omeprazole 20 mg capsule Commonly known as:  PRILOSEC Your last dose was: Your next dose is:    
   
   
 Dose:  20 mg Take 20 mg by mouth daily. Refills:  0  
     
   
   
   
  
 potassium chloride 20 mEq tablet Commonly known as:  K-DUR, KLOR-CON Your last dose was: Your next dose is:    
   
   
 Dose:  20 mEq 20 mEq daily. Refills:  0  
     
   
   
   
  
 pravastatin 40 mg tablet Commonly known as:  PRAVACHOL Your last dose was: Your next dose is:    
   
   
 Dose:  40 mg  
40 mg nightly. Refills:  0 QUEtiapine 25 mg tablet Commonly known as:  SEROquel Your last dose was: Your next dose is:    
   
   
 Dose:  25 mg  
25 mg two (2) times a day. Refills:  0  
     
   
   
   
  
 sertraline 25 mg tablet Commonly known as:  ZOLOFT Your last dose was: Your next dose is:    
   
   
 Dose:  25 mg Take 25 mg by mouth daily. Refills:  0  
     
   
   
   
  
 valsartan 160 mg tablet Commonly known as:  DIOVAN Your last dose was: Your next dose is:    
   
   
 Dose:  160 mg  
160 mg daily. Refills:  0  
     
   
   
   
  
 VITAMIN D3 1,000 unit Cap Generic drug:  cholecalciferol Your last dose was: Your next dose is:    
   
   
 Dose:  125 Units Take 125 Units by mouth daily. Refills:  0 Where to Get Your Medications Information on where to get these meds will be given to you by the nurse or doctor. ! Ask your nurse or doctor about these medications HYDROmorphone 2 mg tablet Discharge Instructions DISCHARGE SUMMARY from Nurse The following personal items are in your possession at time of discharge: Dental Appliances: None Visual Aid: None Home Medications: None Jewelry: None Clothing: Elfredia Moras Other Valuables: None PATIENT INSTRUCTIONS: 
 
 
F-face looks uneven A-arms unable to move or move unevenly S-speech slurred or non-existent T-time-call 911 as soon as signs and symptoms begin-DO NOT go Back to bed or wait to see if you get better-TIME IS BRAIN. Warning Signs of HEART ATTACK Call 911 if you have these symptoms: 
? Chest discomfort. Most heart attacks involve discomfort in the center of the chest that lasts more than a few minutes, or that goes away and comes back. It can feel like uncomfortable pressure, squeezing, fullness, or pain. ? Discomfort in other areas of the upper body. Symptoms can include pain or discomfort in one or both arms, the back, neck, jaw, or stomach. ? Shortness of breath with or without chest discomfort. ? Other signs may include breaking out in a cold sweat, nausea, or lightheadedness. Don't wait more than five minutes to call [address]! Fast action can save your life. Calling 911 is almost always the fastest way to get lifesaving treatment. Emergency Medical Services staff can begin treatment when they arrive  up to an hour sooner than if someone gets to the hospital by car. The discharge information has been reviewed with the patient. The patient verbalized understanding. Discharge medications reviewed with the patient and appropriate educational materials and side effects teaching were provided. Laparoscopically Assisted Vaginal Hysterectomy: What to Expect at [facility] Your Recovery You can expect to feel better and stronger each day, although you may need pain medicine for a week or two. You may get tired easily or have less energy than usual. This may last for several weeks after surgery. You will probably notice that your belly is swollen and puffy. This is common. The swelling will take several weeks to go down. It may take about 4 to 6 weeks to fully recover. The recovery time may be less for some patients. You may have some light vaginal bleeding. Don't have sex until the doctor says it is okay. Don't douche or put anything into your vagina, such as a tampon, until your doctor says it is okay. It is important to avoid lifting while you are recovering so that you can heal. 
This care sheet gives you a general idea about how long it will take for you to recover. But each person recovers at a different pace. Follow the steps below to get better as quickly as possible. How can you care for yourself at home? Activity · Rest when you feel tired. Getting enough sleep will help you recover. · Try to walk each day. Start by walking a little more than you did the day before. Bit by bit, increase the amount you walk. Walking boosts blood flow and helps prevent pneumonia and constipation. · Avoid lifting anything that would make you strain. This may include heavy grocery bags and milk containers, a heavy briefcase or backpack, cat litter or dog food bags, a vacuum , or a child. · Avoid strenuous activities, such as biking, jogging, weight lifting, or aerobic exercise, until your doctor says it is okay. · You may shower. Pat the cut (incision) dry. Do not take a bath for the first 2 weeks, or until your doctor tells you it is okay. · Ask your doctor when you can drive again. · You will probably need to take about 2 weeks off from work. It depends on the type of work you do and how you feel. · Your doctor will tell you when you can have sex again. Diet · You can eat your normal diet. If your stomach is upset, try bland, low-fat foods like plain rice, broiled chicken, toast, and yogurt. · Drink plenty of fluids (unless your doctor tells you not to). · You may notice that your bowel movements are not regular right after your surgery. This is common. Try to avoid constipation and straining with bowel movements. You may want to take a fiber supplement every day. If you have not had a bowel movement after a couple of days, ask your doctor about taking a mild laxative. Medicines · Your doctor will tell you if and when you can restart your medicines. He or she will also give you instructions about taking any new medicines. · If you take blood thinners, such as warfarin (Coumadin), clopidogrel (Plavix), or aspirin, be sure to talk to your doctor. He or she will tell you if and when to start taking those medicines again. Make sure that you understand exactly what your doctor wants you to do. · Be safe with medicines. Take pain medicines exactly as directed. ¨ If the doctor gave you a prescription medicine for pain, take it as prescribed. ¨ If you are not taking a prescription pain medicine, ask your doctor if you can take an over-the-counter medicine. · If you think your pain medicine is making you sick to your stomach: 
¨ Take your medicine after meals (unless your doctor has told you not to). ¨ Ask your doctor for a different pain medicine. · If your doctor prescribed antibiotics, take them as directed. Do not stop taking them just because you feel better. You need to take the full course of antibiotics. Incision care · You may have stitches over the cuts (incisions) the doctor made in your belly. If you have strips of tape on the incisions the doctor made, leave the tape on for a week or until it falls off. Or follow your doctor's instructions for removing the tape. · Wash the area daily with warm, soapy water, and pat it dry.  Don't use hydrogen peroxide or alcohol, which can slow healing. You may cover the area with a gauze bandage if it weeps or rubs against clothing. Change the bandage every day. · Keep the area clean and dry. Other instructions · You may have some light vaginal bleeding. Wear sanitary pads if needed. Do not douche or use tampons. Follow-up care is a key part of your treatment and safety. Be sure to make and go to all appointments, and call your doctor if you are having problems. It's also a good idea to know your test results and keep a list of the medicines you take. When should you call for help? Call 911 anytime you think you may need emergency care. For example, call if: 
· You passed out (lost consciousness). · You have severe trouble breathing. · You have sudden chest pain and shortness of breath, or you cough up blood. Call your doctor now or seek immediate medical care if: 
· You have bright red vaginal bleeding that soaks one or more pads in an hour, or you have large clots. · You have foul-smelling discharge from your vagina. · You are sick to your stomach or cannot keep fluids down. · You have pain that does not get better after you take pain medicine. · You have loose stitches, or your incision comes open. · You have signs of infection, such as: 
¨ Increased pain, swelling, warmth, or redness. ¨ Red streaks leading from the incision. ¨ Pus draining from the incision. ¨ A fever. · You have signs of a blood clot, such as: 
¨ Pain in your calf, back of the knee, thigh, or groin. ¨ Redness and swelling in your leg or groin. · You have trouble passing urine or stool, especially if you have pain or swelling in your lower belly. Watch closely for changes in your health, and be sure to contact your doctor if: 
· You do not have a bowel movement after taking a laxative. · You have hot flashes, sweating, flushing, or a fast heartbeat, but no fever. Where can you learn more? Go to http://edison-lucia.info/. Enter K266 in the search box to learn more about \"Laparoscopically Assisted Vaginal Hysterectomy: What to Expect at Home. \" Current as of: October 13, 2016 Content Version: 11.2 © 6108-1279 [employer]. Care instructions adapted under license by [employer] (which disclaims liability or warranty for this information). If you have questions about a medical condition or this instruction, always ask your healthcare professional. [address] any warranty or liability for your use of this information. Discharge Orders None [employer] Announcement We are excited to announce that we are making your provider's discharge notes available to you in [employer]. You will see these notes when they are completed and signed by the physician that discharged you from your recent hospital stay. If you have any questions or concerns about any information you see in [employer], please call the Health Information Department where you were seen or reach out to your Primary Care Provider for more information about your plan of care. Introducing [facility]! Jarred Wilkerson introduces [employer] patient portal. Now you can access parts of your medical record, email your doctor's office, and request medication refills online. 1. In your internet browser, go to [email or web address] 2. Click on the First Time User? Click Here link in the Sign In box. You will see the New Member Sign Up page. 3. Enter your [employer] Access Code exactly as it appears below. You will not need to use this code after youve completed the sign-up process. If you do not sign up before the expiration date, you must request a new code. · [employer] Access Code: [email or web address] Expires: 6/13/2017  4:28 PM 
 
4.  Enter the last four digits of your Social Security Number (xxxx) and Date of Birth (mm/dd/yyyy) as indicated and click Submit. You will be taken to the next sign-up page. 5. Create a [employer] ID. This will be your [employer] login ID and cannot be changed, so think of one that is secure and easy to remember. 6. Create a [employer] password. You can change your password at any time. 7. Enter your Password Reset Question and Answer. This can be used at a later time if you forget your password. 8. Enter your e-mail address. You will receive e-mail notification when new information is available in [address] 9. Click Sign Up. You can now view and download portions of your medical record. 10. Click the Download Summary menu link to download a portable copy of your medical information. If you have questions, please visit the Frequently Asked Questions section of the [employer] website. Remember, [employer] is NOT to be used for urgent needs. For medical emergencies, dial 911. Now available from your iPhone and Android! General Information Please provide this summary of care documentation to your next provider. Patient Signature:  ____________________________________________________________ Date:  ____________________________________________________________  
  
Deandre Nuñez Provider Signature:  ____________________________________________________________ Date:  ____________________________________________________________

## 2017-04-11 NOTE — PERIOP NOTES
Family updated in PACU waiting room, given room 26, 1 family member is at bedside in PACU to check on pt.

## 2017-04-11 NOTE — PERIOP NOTES
TRANSFER - OUT REPORT:    Verbal report given to [facility] RN (name) on Jose Maier  being transferred to [facility] ([facility]) for routine post - op       Report consisted of patients Situation, Background, Assessment and   Recommendations(SBAR). Information from the following report(s) SBAR, Intake/Output and MAR was reviewed with the receiving nurse. Lines:   Peripheral IV 04/11/17 Right Hand (Active)   Site Assessment Clean, dry, & intact 4/11/2017 10:34 AM   Phlebitis Assessment 0 4/11/2017 10:34 AM   Infiltration Assessment 0 4/11/2017 10:34 AM   Dressing Status Clean, dry, & intact 4/11/2017 10:34 AM   Dressing Type Tape;Transparent 4/11/2017 10:34 AM   Hub Color/Line Status Infusing;Blue;Patent 4/11/2017 10:34 AM       Peripheral IV 04/11/17 Left Wrist (Active)        Opportunity for questions and clarification was provided.       Patient transported with:   O2 @ 2 liters  Registered Nurse  [facility]

## 2017-04-11 NOTE — ANESTHESIA PROCEDURE NOTES
Peripheral Block    Start time: 4/11/2017 12:21 PM  End time: 4/11/2017 12:25 PM  Performed by: Tristin Reeves  Authorized by: Tristin Reeves       Pre-procedure: Indications: at surgeon's request, post-op pain management, procedure for pain and primary anesthetic    Preanesthetic Checklist: patient identified, risks and benefits discussed, site marked, timeout performed, anesthesia consent given and patient being monitored      Block Type:   Block Type:   Interscalene  Laterality:  Right  Monitoring:  Standard ASA monitoring, frequent vital sign checks, continuous pulse ox, heart rate, oxygen and responsive to questions  Injection Technique:  Single shot  Procedures: ultrasound guided and nerve stimulator    Patient Position: seated  Prep: chlorhexidine    Location:  Interscalene  Needle Type:  Stimuplex  Needle Gauge:  20 G  Needle Localization:  Anatomical landmarks, ultrasound guidance and nerve stimulator  Motor Response: minimal motor response >0.4 mA    Medication Injected:  0.5%  Volume (mL):  20    Assessment:  Number of attempts:  1  Injection Assessment:  Incremental injection every 5 mL, negative aspiration for CSF, no paresthesia, ultrasound image on chart, local visualized surrounding nerve on ultrasound, negative aspiration for blood and no intravascular symptoms  Patient tolerance:  Patient tolerated the procedure well with no immediate complications

## 2017-04-11 NOTE — ANESTHESIA PREPROCEDURE EVALUATION
Anesthetic History   No history of anesthetic complications            Review of Systems / Medical History  Patient summary reviewed, nursing notes reviewed and pertinent labs reviewed    Pulmonary  Within defined limits                 Neuro/Psych         TIA     Cardiovascular    Hypertension              Exercise tolerance: >4 METS     GI/Hepatic/Renal  Within defined limits              Endo/Other    Diabetes  Hypothyroidism       Other Findings            Physical Exam    Airway  Mallampati: II  TM Distance: 4 - 6 cm  Neck ROM: normal range of motion   Mouth opening: Normal     Cardiovascular  Regular rate and rhythm,  S1 and S2 normal,  no murmur, click, rub, or gallop             Dental         Pulmonary  Breath sounds clear to auscultation               Abdominal  GI exam deferred       Other Findings            Anesthetic Plan    ASA: 4  Anesthesia type: general          Induction: Intravenous  Anesthetic plan and risks discussed with: Patient and Son / Daughter      Risks fully explained to the patient's family and they want to proceed.

## 2017-04-11 NOTE — PERIOP NOTES
Patient transfer to room 309. Family in [address] [place] [address] waiting room. Handoff with Raeann Hamilton RN.     Visit Vitals    /88 (BP 1 Location: Left arm, BP Patient Position: At rest)    Pulse 85    Temp 97.4 °F (36.3 °C)    Resp 14    Wt 55.2 kg (121 lb 11.2 oz)    SpO2 100%    BMI 23.77 kg/m2

## 2017-04-11 NOTE — ROUTINE PROCESS
TRANSFER - IN REPORT:    Verbal report received from [facility] RN(name) on Shankar Pablo  being received from [facility]) for ordered procedure      Report consisted of patients Situation, Background, Assessment and   Recommendations(SBAR). Information from the following report(s) SBAR, OR Summary, Procedure Summary, Intake/Output and MAR was reviewed with the receiving nurse. Opportunity for questions and clarification was provided. Assessment completed upon patients arrival to unit and care assumed.

## 2017-04-11 NOTE — ANESTHESIA POSTPROCEDURE EVALUATION
Post-Anesthesia Evaluation & Assessment    Visit Vitals    /50    Pulse 65    Temp 36.3 °C (97.4 °F)    Resp 9    Wt 55.2 kg (121 lb 11.2 oz)    SpO2 100%    BMI 23.77 kg/m2       Nausea/Vomiting: no nausea    Post-operative hydration adequate. Pain score (VAS): 3    Mental status & Level of consciousness: alert and oriented x 3    Neurological status: moves all extremities, sensation grossly intact    Pulmonary status: airway patent, no supplemental oxygen required    Complications related to anesthesia: none    Patient has met all discharge requirements.     Additional comments:        Ruben Chapin MD

## 2017-04-12 LAB
ALBUMIN SERPL BCP-MCNC: 2.5 G/DL (ref 3.4–5)
ALBUMIN/GLOB SERPL: 0.6 {RATIO} (ref 0.8–1.7)
ALP SERPL-CCNC: 63 U/L (ref 45–117)
ALT SERPL-CCNC: 28 U/L (ref 13–56)
ANION GAP BLD CALC-SCNC: 14 MMOL/L (ref 3–18)
AST SERPL W P-5'-P-CCNC: 23 U/L (ref 15–37)
BASOPHILS # BLD AUTO: 0 K/UL (ref 0–0.06)
BASOPHILS # BLD: 0 % (ref 0–2)
BILIRUB SERPL-MCNC: 0.3 MG/DL (ref 0.2–1)
BUN SERPL-MCNC: 16 MG/DL (ref 7–18)
BUN/CREAT SERPL: 16 (ref 12–20)
CALCIUM SERPL-MCNC: 9.1 MG/DL (ref 8.5–10.1)
CHLORIDE SERPL-SCNC: 109 MMOL/L (ref 100–108)
CO2 SERPL-SCNC: 20 MMOL/L (ref 21–32)
CREAT SERPL-MCNC: 1 MG/DL (ref 0.6–1.3)
CREAT SERPL-MCNC: 1.03 MG/DL (ref 0.6–1.3)
DIFFERENTIAL METHOD BLD: ABNORMAL
EOSINOPHIL # BLD: 0 K/UL (ref 0–0.4)
EOSINOPHIL NFR BLD: 1 % (ref 0–5)
ERYTHROCYTE [DISTWIDTH] IN BLOOD BY AUTOMATED COUNT: 13.8 % (ref 11.6–14.5)
GLOBULIN SER CALC-MCNC: 4.1 G/DL (ref 2–4)
GLUCOSE BLD STRIP.AUTO-MCNC: 122 MG/DL (ref 70–110)
GLUCOSE BLD STRIP.AUTO-MCNC: 80 MG/DL (ref 70–110)
GLUCOSE BLD STRIP.AUTO-MCNC: 81 MG/DL (ref 70–110)
GLUCOSE BLD STRIP.AUTO-MCNC: 90 MG/DL (ref 70–110)
GLUCOSE SERPL-MCNC: 98 MG/DL (ref 74–99)
HCT VFR BLD AUTO: 35.6 % (ref 35–45)
HGB BLD-MCNC: 11.5 G/DL (ref 12–16)
LYMPHOCYTES # BLD AUTO: 22 % (ref 21–52)
LYMPHOCYTES # BLD: 1.7 K/UL (ref 0.9–3.6)
MCH RBC QN AUTO: 28.5 PG (ref 24–34)
MCHC RBC AUTO-ENTMCNC: 32.3 G/DL (ref 31–37)
MCV RBC AUTO: 88.1 FL (ref 74–97)
MONOCYTES # BLD: 0.6 K/UL (ref 0.05–1.2)
MONOCYTES NFR BLD AUTO: 8 % (ref 3–10)
NEUTS SEG # BLD: 5.5 K/UL (ref 1.8–8)
NEUTS SEG NFR BLD AUTO: 69 % (ref 40–73)
PLATELET # BLD AUTO: 254 K/UL (ref 135–420)
PMV BLD AUTO: 10.4 FL (ref 9.2–11.8)
POTASSIUM SERPL-SCNC: 4 MMOL/L (ref 3.5–5.5)
PROT SERPL-MCNC: 6.6 G/DL (ref 6.4–8.2)
RBC # BLD AUTO: 4.04 M/UL (ref 4.2–5.3)
SODIUM SERPL-SCNC: 143 MMOL/L (ref 136–145)
WBC # BLD AUTO: 7.8 K/UL (ref 4.6–13.2)

## 2017-04-12 PROCEDURE — 74011000258 HC RX REV CODE- 258: Performed by: OBSTETRICS & GYNECOLOGY

## 2017-04-12 PROCEDURE — 36415 COLL VENOUS BLD VENIPUNCTURE: CPT | Performed by: OBSTETRICS & GYNECOLOGY

## 2017-04-12 PROCEDURE — G8996 SWALLOW CURRENT STATUS: HCPCS

## 2017-04-12 PROCEDURE — 77010033678 HC OXYGEN DAILY

## 2017-04-12 PROCEDURE — G8997 SWALLOW GOAL STATUS: HCPCS

## 2017-04-12 PROCEDURE — 92610 EVALUATE SWALLOWING FUNCTION: CPT

## 2017-04-12 PROCEDURE — 74011250636 HC RX REV CODE- 250/636: Performed by: OBSTETRICS & GYNECOLOGY

## 2017-04-12 PROCEDURE — 74011250637 HC RX REV CODE- 250/637: Performed by: OBSTETRICS & GYNECOLOGY

## 2017-04-12 PROCEDURE — 74011250637 HC RX REV CODE- 250/637: Performed by: NURSE PRACTITIONER

## 2017-04-12 PROCEDURE — 99218 HC RM OBSERVATION: CPT

## 2017-04-12 PROCEDURE — 85025 COMPLETE CBC W/AUTO DIFF WBC: CPT | Performed by: OBSTETRICS & GYNECOLOGY

## 2017-04-12 PROCEDURE — C9113 INJ PANTOPRAZOLE SODIUM, VIA: HCPCS | Performed by: OBSTETRICS & GYNECOLOGY

## 2017-04-12 PROCEDURE — 82962 GLUCOSE BLOOD TEST: CPT

## 2017-04-12 PROCEDURE — 74011000250 HC RX REV CODE- 250: Performed by: OBSTETRICS & GYNECOLOGY

## 2017-04-12 PROCEDURE — 80053 COMPREHEN METABOLIC PANEL: CPT | Performed by: OBSTETRICS & GYNECOLOGY

## 2017-04-12 RX ORDER — HYDRALAZINE HYDROCHLORIDE 25 MG/1
50 TABLET, FILM COATED ORAL 2 TIMES DAILY
Status: DISCONTINUED | OUTPATIENT
Start: 2017-04-12 | End: 2017-04-13 | Stop reason: HOSPADM

## 2017-04-12 RX ORDER — HYDROMORPHONE HYDROCHLORIDE 2 MG/ML
0.2 INJECTION, SOLUTION INTRAMUSCULAR; INTRAVENOUS; SUBCUTANEOUS
Status: DISCONTINUED | OUTPATIENT
Start: 2017-04-12 | End: 2017-04-13

## 2017-04-12 RX ADMIN — Medication 10 ML: at 15:14

## 2017-04-12 RX ADMIN — PRAVASTATIN SODIUM 40 MG: 20 TABLET ORAL at 21:48

## 2017-04-12 RX ADMIN — SODIUM CHLORIDE 40 MG: 9 INJECTION INTRAMUSCULAR; INTRAVENOUS; SUBCUTANEOUS at 15:10

## 2017-04-12 RX ADMIN — HYDRALAZINE HYDROCHLORIDE 50 MG: 25 TABLET, FILM COATED ORAL at 21:48

## 2017-04-12 RX ADMIN — SODIUM CHLORIDE 125 ML/HR: 900 INJECTION, SOLUTION INTRAVENOUS at 00:38

## 2017-04-12 RX ADMIN — LEVOTHYROXINE SODIUM 25 MCG: 25 TABLET ORAL at 09:54

## 2017-04-12 RX ADMIN — CEFOXITIN 2 G: 2 INJECTION, POWDER, FOR SOLUTION INTRAVENOUS at 06:02

## 2017-04-12 RX ADMIN — CEFOXITIN 2 G: 2 INJECTION, POWDER, FOR SOLUTION INTRAVENOUS at 00:38

## 2017-04-12 RX ADMIN — METOPROLOL TARTRATE 50 MG: 50 TABLET ORAL at 21:49

## 2017-04-12 RX ADMIN — QUETIAPINE FUMARATE 25 MG: 25 TABLET, FILM COATED ORAL at 09:53

## 2017-04-12 RX ADMIN — VALSARTAN 160 MG: 160 TABLET ORAL at 09:54

## 2017-04-12 RX ADMIN — FUROSEMIDE 20 MG: 20 TABLET ORAL at 09:53

## 2017-04-12 RX ADMIN — HYDROMORPHONE HYDROCHLORIDE 0.5 MG: 1 INJECTION, SOLUTION INTRAMUSCULAR; INTRAVENOUS; SUBCUTANEOUS at 00:38

## 2017-04-12 RX ADMIN — Medication 10 ML: at 21:56

## 2017-04-12 RX ADMIN — SERTRALINE HYDROCHLORIDE 25 MG: 50 TABLET ORAL at 09:53

## 2017-04-12 RX ADMIN — ENOXAPARIN SODIUM 40 MG: 40 INJECTION SUBCUTANEOUS at 09:55

## 2017-04-12 RX ADMIN — QUETIAPINE FUMARATE 25 MG: 25 TABLET, FILM COATED ORAL at 21:49

## 2017-04-12 RX ADMIN — AMLODIPINE BESYLATE 5 MG: 5 TABLET ORAL at 09:53

## 2017-04-12 RX ADMIN — METOPROLOL TARTRATE 50 MG: 50 TABLET ORAL at 09:54

## 2017-04-12 RX ADMIN — Medication 10 ML: at 00:46

## 2017-04-12 NOTE — PROGRESS NOTES
7814 Held apressoline, lopressor, seroquel, and pravachol because pt was not aware enough to swallow with command despite assistance from family member. Dilaudid was given for pain and mefoxitin given as scheduled. SHIFT SUMMARY: Pt drowsy, but grunts when in pain. Dilaudid given for pain. Costa catheter draining. Lap sites with dermabond remained intact during this night shift.

## 2017-04-12 NOTE — PROGRESS NOTES
Assessment completed. Pt in bed resting with family at bedside. Pt is non responsive. Lung sounds diminished. Bowel sounds hypoactive. Surgical dressing dermabond, no redness or drainage. Pt has contractures to all extremities. Palpable radial and pedal pulses.  Costa output 300ml

## 2017-04-12 NOTE — PROGRESS NOTES
Oral and Written notification given to patient and/or caregiver informing them that they are currently an Outpatient receiving care in our facility. Outpatient services include Observation Services under [place] and [facility] requirements.

## 2017-04-12 NOTE — ROUTINE PROCESS
Bedside and Verbal shift change report given to Zara Orellana RN  (oncoming nurse) by Mel Nicloe RN  (offgoing nurse). Report included the following information SBAR, Kardex, Intake/Output and MAR.

## 2017-04-12 NOTE — PROGRESS NOTES
Admit date: 4/11/2017        ASSESSMENT/PLAN  Karlo Noel is a 70 y. o.female  POD#1 s/p TLH/BSO/cysto  Onc - uterine carcinosarcoma  GI - able to take medications with applesauce  FEN - NS at 125/hr, lytes stable, diet as tolerated, speech pathology s/p swallow eval will order nutrition consult  ID - afebrile  Renal - UOP ok, creat nl, due to void   Heme - hgb 11.5, will monitor  DVT Prophylaxis - Lovenox, SCDs  CV - stable  Pulm - no issues  Disp - continue to monitor, will further evaluate in the morning for appropriate d/c planning. SUBJECTIVE  Patient noncomunicative, family states that she has not been eating or drinking as well (since prior to surgery).  States that patient is more lethargic than usual.      OBJECTIVE  Physical Exam:  Patient Vitals for the past 24 hrs:   BP Temp Pulse Resp SpO2 Weight   04/12/17 1433 122/76 98.4 °F (36.9 °C) 86 16 100 % -   04/12/17 1133 115/76 98.8 °F (37.1 °C) 90 16 100 % -   04/12/17 0741 111/65 98.8 °F (37.1 °C) 93 16 96 % -   04/12/17 0338 106/71 97.5 °F (36.4 °C) 64 15 100 % -   04/11/17 2326 130/86 97.6 °F (36.4 °C) 62 15 100 % 55.2 kg (121 lb 11.2 oz)   04/11/17 2152 124/63 97.8 °F (36.6 °C) 85 15 100 % -   04/11/17 2015 (!) 120/100 97.3 °F (36.3 °C) 78 15 100 % -   04/11/17 1737 124/74 97.1 °F (36.2 °C) 91 15 100 % -         Intake/Output Summary (Last 24 hours) at 04/12/17 1641  Last data filed at 04/12/17 1400   Gross per 24 hour   Intake          1118.67 ml   Output              475 ml   Net           643.67 ml        General - resting in no acute distress, noncomunicative  HEENT - within normal limits  Heart - regular rate and rhythm  Lungs - clear to auscultation  Abdomen - soft, nontender, nondistended, hypoactive bowel sounds  Incision - clean, dry, intact  Extremities - no edema, contracted    Labs  CBC  Recent Labs      04/12/17   0414   WBC  7.8   HCT  35.6   MCV  88.1   MONOS  8   EOS  1   BASOS  0        CMP  Recent Labs      04/12/17   0414   NA 143   CO2  20*   BUN  16        Lab Results   Component Value Date/Time    Glucose 98 04/12/2017 04:14 AM    Glucose (POC) 90 04/12/2017 11:32 AM          Current Hospital Medications    Current Facility-Administered Medications:     hydrALAZINE (APRESOLINE) tablet 50 mg, 50 mg, Oral, BID, Nelson Dover NP    amLODIPine (NORVASC) tablet 5 mg, 5 mg, Oral, DAILY, Jones Melo MD, 5 mg at 04/12/17 0953    furosemide (LASIX) tablet 20 mg, 20 mg, Oral, DAILY, Seoeufemia Melo MD, 20 mg at 04/12/17 0953    levothyroxine (SYNTHROID) tablet 25 mcg, 25 mcg, Oral, DAILY, Gabi Gaitan MD, 25 mcg at 04/12/17 0954    metoprolol tartrate (LOPRESSOR) tablet 50 mg, 50 mg, Oral, BID, Gabi Gaitan MD, 50 mg at 04/12/17 0954    potassium chloride (K-DUR, KLOR-CON) SR tablet 20 mEq, 20 mEq, Oral, DAILY, Gabi Gaitan MD, Stopped at 04/12/17 3154    pravastatin (PRAVACHOL) tablet 40 mg, 40 mg, Oral, QHS, Gabi Gaitan MD, Stopped at 04/12/17 0040    QUEtiapine (SEROquel) tablet 25 mg, 25 mg, Oral, BID, Gabi Gaitan MD, 25 mg at 04/12/17 0953    sertraline (ZOLOFT) tablet 25 mg, 25 mg, Oral, DAILY, Gabi Gaitan MD, 25 mg at 04/12/17 0953    valsartan (DIOVAN) tablet 160 mg, 160 mg, Oral, DAILY, Beni Melo MD, 160 mg at 04/12/17 0954    ondansetron (ZOFRAN) injection 4 mg, 4 mg, IntraVENous, Q4H PRN, Gabi Gaitan MD    0.9% sodium chloride infusion, 125 mL/hr, IntraVENous, CONTINUOUS, Gabi Gaitan MD, Last Rate: 125 mL/hr at 04/12/17 0038, 125 mL/hr at 04/12/17 0038    sodium chloride (NS) flush 5-10 mL, 5-10 mL, IntraVENous, Q8H, Jones Melo MD, 10 mL at 04/12/17 1514    sodium chloride (NS) flush 5-10 mL, 5-10 mL, IntraVENous, PRN, Gabi Gaitna MD    enoxaparin (LOVENOX) injection 40 mg, 40 mg, SubCUTAneous, DAILY, Jones Melo MD, 40 mg at 04/12/17 0978    pantoprazole (PROTONIX) 40 mg in sodium chloride 0.9 % 10 mL injection, 40 mg, IntraVENous, Q24H, Qiana Chowdhury MD, 40 mg at 04/12/17 1015 Herreid, NP   Pager  [phone]

## 2017-04-12 NOTE — PROGRESS NOTES
Problem: Dysphagia (Adult)  Goal: *Acute Goals and Plan of Care (Insert Text)  Dysphagia Present: Yes    Recommendations:  Diet: Puree   Meds: Crushed in puree   Aspiration Precautions  Oral Care TID  Other: small bites/sips via teaspoon only; feed when alert    Goals: Patient will:  1. Tolerate PO trials with 0 s/s overt distress in 4/5 trials  2. Utilize compensatory swallow strategies/maneuvers (decrease bite/sip, size/rate, alt. liq/sol) with min cues in 4/5 trials  Outcome: Progressing Towards Goal  SPEECH LANGUAGE PATHOLOGY BEDSIDE SWALLOW EVALUATION     Patient: Shankar Pablo (47 y.o. female)  Date: 4/12/2017  Primary Diagnosis: UTERINE CANCER  UTERINE CANCER  Procedure(s) (LRB):  TOTAL LAPAROSCOPIC HYSTERECTOMY,BILATERAL SALPINGO OOHORECTOMY,CYSTOSCOPY **SPEC POP** (N/A) 1 Day Post-Op   Precautions: Aspiration         ASSESSMENT :  Based on the objective data described below, the patient presents with moderate/severe oropharyngeal dysphagia. Clinical bedside swallowing evaluation completed. Patient lethargic with poor participation; fluctuating level alertness. Pt aroused via sternal rub and bed elevation. Sustained attention to task was poor even with maximum cues. Trialed puree and thin liquids via spoon without event. Per family report, baseline include pocketing foods. Oral stimulation via buccal pressure successful for eliciting swallow 1 of 2 trials after demo poor bolus acceptance. Dysphagia c/w poor bolus acceptance, delayed swallowing initiation, decreased ability to self-feed, and significant cognitive limitations. Patient will benefit from skilled intervention to address the above impairments.   Patients rehabilitation potential is considered to be Guarded  Factors which may influence rehabilitation potential include:   [ ]            None noted  [X]            Mental ability/status  [X]            Medical condition  [ ]            Home/family situation and support systems  [ ] Safety awareness  [ ]            Pain tolerance/management  [ ]            Other:        PLAN : puree with thin liquids via spoon   Recommendations and Planned Interventions:  ST to f/u re: safe swallow guidelines, diet tolerance/advancement, and ongoing education. Frequency/Duration: Patient will be followed by speech-language pathology 1-2 times per day/4-7 days per week to address goals. Discharge Recommendations: Home Health vs Skilled Nursing Facility        SUBJECTIVE:   Patient stated . Rhoda Diego . Family stated: \"She won't wake up for me, but she ate the applesauce without problems. \"      OBJECTIVE:       Past Medical History:   Diagnosis Date    Cancer ([facility] [address] 2017     in process of being worked up, probable cancer diagnosis    Dementia      [email or web address] 1997     type 2    Hypercholesteremia      Hypertension      Ill-defined condition 2012     Alzheimers    PMB (postmenopausal bleeding)      Psychiatric disorder      Stroke ([facility] 2004, 2010     contracted left leg, non-ambulatory    Thyroid disease       Past Surgical History:   Procedure Laterality Date    HX BACK SURGERY         pinched nerve    HX OTHER SURGICAL         abdominal surgery unknown specific possible gallbladder or appendix     Prior Level of Function/Home Situation: Per pt/chart   Home Situation  Home Environment: Trailer/mobile home  # Steps to Enter: 5  One/Two Story Residence: One story  Living Alone: No  Support Systems: Family member(s)  Patient Expects to be Discharged to[de-identified] Trailer/mobile home  Current DME Used/Available at Home: None  Diet prior to admission: regular   Current Diet:  puree  Cognitive and Communication Status:  Neurologic State: Lethargic  Orientation Level: Disoriented X4  Cognition: Memory loss, No command following, Poor safety awareness        Safety/Judgement: Lack of insight into deficits  Oral Assessment:  Oral Assessment  Labial: No impairment  Dentition: Limited  Oral Hygiene: poor  Lingual: Decreased rate  Velum: No impairment  P.O. Trials:  Patient Position: 39 HOB  Vocal quality prior to P.O.: Other (comment) (unable to assess)  Consistency Presented: Thin liquid;Puree  How Presented: SLP-fed/presented;Spoon  How Much:  (x1 puree; x2 thin)  Bolus Acceptance: Impaired  Bolus Formation/Control: Impaired  Type of Impairment: Mastication; Incomplete;Delayed;Poor  Propulsion: Delayed (# of seconds)  Oral Residue: Pocketing;Less than 10% of bolus  Initiation of Swallow: Delayed (# of seconds)  Laryngeal Elevation: Decreased  Aspiration Signs/Symptoms: None  Pharyngeal Phase Characteristics: No impairment, issues, or problems   Effective Modifications: Spoon  Cues for Modifications: None        Oral Phase Severity: Moderate-severe  Pharyngeal Phase Severity : Mild     GCODESwallowing:  Swallow Current Status CL= 60-79%   Swallow Goal Status CL= 60-79%     The severity rating is based on the following outcomes:  RAJIV Noms Swallow Level 3              Clinical Judgement     PAIN:  Start of Eval: 0 reported   End of Eval: 0 reported     After treatment:   [ ]            Patient left in no apparent distress sitting up in chair  [X]            Patient left in no apparent distress in bed  [ ]            Call bell left within reach  [X]            Nursing notified  [X]            Family present  [ ]            Caregiver present  [ ]            Bed alarm activated      COMMUNICATION/EDUCATION:   [X]            Aspiration precautions; swallow safety; compensatory techniques. [X]            Patient/family have participated as able in goal setting and plan of care. [X]            Patient/family agree to work toward stated goals and plan of care. [ ]            Patient understands intent and goals of therapy; neutral about participation.   [X]            Patient unable to participate in goal setting/plan of care; educ ongoing with interdisciplinary staff  [X]            Posted safety precautions in patient's room.     Thank you for this referral.  Lwo Mercado, SLP  Time Calculation: 30 mins

## 2017-04-12 NOTE — ROUTINE PROCESS
Bedside and Verbal shift change report given to HANNAH Tavarez RN (oncoming nurse) by Jamey Tavarez   (offgoing nurse). Report included the following information SBAR, Procedure Summary, Intake/Output and MAR.      Pt had uneventful shift and tolerated medications well  Patient Vitals for the past 4 hrs:   Temp Pulse Resp BP SpO2   04/11/17 1737 97.1 °F (36.2 °C) 91 15 124/74 100 %

## 2017-04-12 NOTE — ROUTINE PROCESS
Bedside shift change report given to MAN Mendoza RN(oncoming nurse) by Kristina Stinson RN  (offgoing nurse). Report included the following information SBAR, Kardex and MAR.

## 2017-04-12 NOTE — PROGRESS NOTES
Occupational Therapy Screening:  Services are not indicated at this time. [facility] based on results obtained during the nursing admission assessment. The patients chart was reviewed and the patient is not appropriate for a skilled therapy evaluation at this time. Please consult occupational therapy if any therapy needs arise. Thank you.    ---Lorraine Garcia, OTR/L

## 2017-04-13 VITALS
RESPIRATION RATE: 16 BRPM | BODY MASS INDEX: 27.4 KG/M2 | OXYGEN SATURATION: 100 % | DIASTOLIC BLOOD PRESSURE: 49 MMHG | TEMPERATURE: 98.1 F | HEART RATE: 79 BPM | SYSTOLIC BLOOD PRESSURE: 102 MMHG | WEIGHT: 140.3 LBS

## 2017-04-13 LAB
GLUCOSE BLD STRIP.AUTO-MCNC: 109 MG/DL (ref 70–110)
GLUCOSE BLD STRIP.AUTO-MCNC: 98 MG/DL (ref 70–110)

## 2017-04-13 PROCEDURE — 74011250636 HC RX REV CODE- 250/636: Performed by: NURSE PRACTITIONER

## 2017-04-13 PROCEDURE — 92526 ORAL FUNCTION THERAPY: CPT

## 2017-04-13 PROCEDURE — 99218 HC RM OBSERVATION: CPT

## 2017-04-13 PROCEDURE — 82962 GLUCOSE BLOOD TEST: CPT

## 2017-04-13 PROCEDURE — 77010033678 HC OXYGEN DAILY

## 2017-04-13 PROCEDURE — 77030032489 HC SLV COMPR SCD FT CUF COVD -B

## 2017-04-13 PROCEDURE — 74011250637 HC RX REV CODE- 250/637: Performed by: OBSTETRICS & GYNECOLOGY

## 2017-04-13 PROCEDURE — 74011250637 HC RX REV CODE- 250/637: Performed by: NURSE PRACTITIONER

## 2017-04-13 PROCEDURE — 74011250636 HC RX REV CODE- 250/636: Performed by: OBSTETRICS & GYNECOLOGY

## 2017-04-13 RX ORDER — HYDROMORPHONE HYDROCHLORIDE 2 MG/1
2 TABLET ORAL
Qty: 60 TAB | Refills: 0 | Status: SHIPPED | OUTPATIENT
Start: 2017-04-13

## 2017-04-13 RX ADMIN — ENOXAPARIN SODIUM 40 MG: 40 INJECTION SUBCUTANEOUS at 08:50

## 2017-04-13 RX ADMIN — POTASSIUM CHLORIDE 20 MEQ: 20 TABLET, EXTENDED RELEASE ORAL at 08:51

## 2017-04-13 RX ADMIN — AMLODIPINE BESYLATE 5 MG: 5 TABLET ORAL at 08:51

## 2017-04-13 RX ADMIN — QUETIAPINE FUMARATE 25 MG: 25 TABLET, FILM COATED ORAL at 08:51

## 2017-04-13 RX ADMIN — LEVOTHYROXINE SODIUM 25 MCG: 25 TABLET ORAL at 08:51

## 2017-04-13 RX ADMIN — FUROSEMIDE 20 MG: 20 TABLET ORAL at 08:51

## 2017-04-13 RX ADMIN — VALSARTAN 160 MG: 160 TABLET ORAL at 08:51

## 2017-04-13 RX ADMIN — METOPROLOL TARTRATE 50 MG: 50 TABLET ORAL at 08:51

## 2017-04-13 RX ADMIN — HYDROMORPHONE HYDROCHLORIDE 0.2 MG: 2 INJECTION INTRAMUSCULAR; INTRAVENOUS; SUBCUTANEOUS at 07:43

## 2017-04-13 RX ADMIN — SERTRALINE HYDROCHLORIDE 25 MG: 50 TABLET ORAL at 08:51

## 2017-04-13 RX ADMIN — HYDRALAZINE HYDROCHLORIDE 50 MG: 25 TABLET, FILM COATED ORAL at 08:51

## 2017-04-13 RX ADMIN — SODIUM CHLORIDE 125 ML/HR: 900 INJECTION, SOLUTION INTRAVENOUS at 01:39

## 2017-04-13 NOTE — PROGRESS NOTES
Patient is not available to be assessed at this time. Left information with patient's family. Explained  Services to family.        [address] [facility].   [phone]

## 2017-04-13 NOTE — PROGRESS NOTES
Problem: Dysphagia (Adult)  Goal: *Acute Goals and Plan of Care (Insert Text)  Dysphagia Present: Yes    Recommendations:  Diet: Puree   Meds: Crushed in puree   Aspiration Precautions  Oral Care TID  Other: small bites/sips via teaspoon only; feed when alert    Goals: Patient will:  1. Tolerate PO trials with 0 s/s overt distress in 4/5 trials  2. Utilize compensatory swallow strategies/maneuvers (decrease bite/sip, size/rate, alt. liq/sol) with min cues in 4/5 trials   Outcome: Progressing Towards Goal  SPEECH LANGUAGE PATHOLOGY DYSPHAGIA TREATMENT     Patient: Dany Mosley (64 y.o. female)  Date: 4/13/2017  Diagnosis: UTERINE CANCER  UTERINE CANCER <principal problem not specified>  Procedure(s) (LRB):  TOTAL LAPAROSCOPIC HYSTERECTOMY,BILATERAL SALPINGO OOHORECTOMY,CYSTOSCOPY **SPEC POP** (N/A) 2 Days Post-Op  Precautions: Aspiration        ASSESSMENT:  Moderate/severe oropharyngeal dysphagia. Pt alert during therapy session; no command following or task specific verbalizations. Therapeutic trials of puree and thin liquids administered. No overt s/sx of aspiration/penetration noted with puree; however pt required maximum encouragement and prompts for PO intake via spoon. Able to refuse via head turn and accept bolus when spoon grazes lips. Noted bolus holding with ice chip without swallow initiation and delayed pharyngeal, resulting in wet vocal quality. Mildly delayed pharyngeal swallow with thin liquids via spoon without event. Question pt's abilities to meet nutritional needs orally d/t baseline dementia. Alternative methods to meet nutritional needs and/or palliative consult recommended should pt demo min progress in increasing PO intake, tolerancing of diet, and continued weight loss.       Progression toward goals:  [ ]         Improving appropriately and progressing toward goals  [X]         Improving slowly and progressing toward goals  [ ]         Not making progress toward goals and plan of care will be adjusted       PLAN: Puree with Thin   Recommendations and Planned Interventions:  ST f/u for diet tolerance/advancement, environmental modifications, and ongoing family/caregiver education d/t pt's inability to comprehend complex commands. Patient continues to benefit from skilled intervention to address the above impairments. Continue treatment per established plan of care. Discharge Recommendations:  Home Health vs Rehab; cognitive abilities appear appropriate for LTC. SUBJECTIVE:   Patient stated Oh my god. OBJECTIVE:   Cognitive and Communication Status:  Neurologic State: Alert  Orientation Level: Unable to verbalize  Cognition: Memory loss, No command following        Safety/Judgement: Lack of insight into deficits  Dysphagia Treatment:  Oral Assessment:  Oral Assessment  Labial: No impairment  Dentition: Limited  Oral Hygiene: poor  Lingual: Decreased rate  Velum: No impairment  P.O. Trials:              Patient Position: 39 HOB              Vocal quality prior to P.O.: No impairment              Consistency Presented:  Thin liquid, Puree, Ice chips              How Presented: SLP-fed/presented, Spoon              How Much:  (x4 puree; x1 oz thin, x1 ice chip)              Bolus Acceptance: Impaired              Bolus Formation/Control: Impaired              Type of Impairment: Mastication, Incomplete, Delayed              Propulsion: Delayed (# of seconds)              Oral Residue: Less than 10% of bolus, Pocketing              Initiation of Swallow: Delayed (# of seconds)              Laryngeal Elevation: Decreased, Functional              Aspiration Signs/Symptoms: Change vocal quality              Pharyngeal Phase Characteristics: Altered vocal quality              Effective Modifications: Spoon              Cues for Modifications: None                                Oral Phase Severity: Moderate-severe              Pharyngeal Phase Severity : Moderate PAIN:  Start of Tx: 0 reported   End of Tx: 0 reported     After treatment:   [ ]              Patient left in no apparent distress sitting up in chair  [X]              Patient left in no apparent distress in bed  [ ]              Call bell left within reach  [ ]              Nursing notified  [ ]              Family present  [ ]              Caregiver present  [ ]              Bed alarm activated         COMMUNICATION/EDUCATION:   [X]        Aspiration precautions; swallow safety; compensatory techniques  [X]        Patient unable to participate in education; education ongoing with staff  [ ]         Posted safety precautions in patient's room.   [ ]         Oral-motor/laryngeal strengthening exercises        ALEXX Lloyd  Time Calculation: 10 mins

## 2017-04-13 NOTE — PROGRESS NOTES
0128 - Pt sleeping in bed at this time. Pt opens eyes to name, nonverbal, unable to access orientation. Respirations even and unlabored. 2L NC. No signs of pain. 20G to left wrist intact and patent. Upper and lower extremities contracted. x4 lap sites to abdomen intact with no signs of infection. SCD to RLE, Plexi applied to LLE. Will continue to monitor. Bed in lowest position, call bell within reach. 5875 - Patient rated pain 7/10, pain medication administered per MAR. No other concerns voiced at this time. Call bell within reach, bed in lowest position.

## 2017-04-13 NOTE — ROUTINE PROCESS
Bedside and Verbal shift change report given to Daniel Vergara RN (oncoming nurse) by Randa Berrios   (offgoing nurse). Report included the following information SBAR, Intake/Output and MAR.        Pt had uneventful shift and tolerated medications well  Patient Vitals for the past 12 hrs:   Temp Pulse Resp BP SpO2   04/12/17 1939 99.6 °F (37.6 °C) 89 16 126/55 100 %   04/12/17 1433 98.4 °F (36.9 °C) 86 16 122/76 100 %   04/12/17 1133 98.8 °F (37.1 °C) 90 16 115/76 100 %

## 2017-04-13 NOTE — DISCHARGE INSTRUCTIONS
DISCHARGE SUMMARY from Nurse    The following personal items are in your possession at time of discharge:    Dental Appliances: None  Visual Aid: None     Home Medications: None  Jewelry: None  Clothing: Dress, Socks  Other Valuables: None             PATIENT INSTRUCTIONS:    After general anesthesia or intravenous sedation, for 24 hours or while taking prescription Narcotics:  · Limit your activities  · Do not drive and operate hazardous machinery  · Do not make important personal or business decisions  · Do  not drink alcoholic beverages  · If you have not urinated within 8 hours after discharge, please contact your surgeon on call. Report the following to your surgeon:  · Excessive pain, swelling, redness or odor of or around the surgical area  · Temperature over 100.5  · Nausea and vomiting lasting longer than 4 hours or if unable to take medications  · Any signs of decreased circulation or nerve impairment to extremity: change in color, persistent  numbness, tingling, coldness or increase pain  · Any questions        What to do at Home:  Recommended activity: Activity as tolerated. If you experience any of the following symptoms Temp over 101, increase bleeding, please follow up with Dr. Indra Sahu. *  Please give a list of your current medications to your Primary Care Provider. *  Please update this list whenever your medications are discontinued, doses are      changed, or new medications (including over-the-counter products) are added. *  Please carry medication information at all times in case of emergency situations. These are general instructions for a healthy lifestyle:    No smoking/ No tobacco products/ Avoid exposure to second hand smoke    Surgeon General's Warning:  Quitting smoking now greatly reduces serious risk to your health.     Obesity, smoking, and sedentary lifestyle greatly increases your risk for illness    A healthy diet, regular physical exercise & weight monitoring are important for maintaining a healthy lifestyle    You may be retaining fluid if you have a history of heart failure or if you experience any of the following symptoms:  Weight gain of 3 pounds or more overnight or 5 pounds in a week, increased swelling in our hands or feet or shortness of breath while lying flat in bed. Please call your doctor as soon as you notice any of these symptoms; do not wait until your next office visit. Recognize signs and symptoms of STROKE:    F-face looks uneven    A-arms unable to move or move unevenly    S-speech slurred or non-existent    T-time-call 911 as soon as signs and symptoms begin-DO NOT go       Back to bed or wait to see if you get better-TIME IS BRAIN. Warning Signs of HEART ATTACK     Call 911 if you have these symptoms:   Chest discomfort. Most heart attacks involve discomfort in the center of the chest that lasts more than a few minutes, or that goes away and comes back. It can feel like uncomfortable pressure, squeezing, fullness, or pain.  Discomfort in other areas of the upper body. Symptoms can include pain or discomfort in one or both arms, the back, neck, jaw, or stomach.  Shortness of breath with or without chest discomfort.  Other signs may include breaking out in a cold sweat, nausea, or lightheadedness. Don't wait more than five minutes to call 911 - MINUTES MATTER! Fast action can save your life. Calling 911 is almost always the fastest way to get lifesaving treatment. Emergency Medical Services staff can begin treatment when they arrive -- up to an hour sooner than if someone gets to the hospital by car. The discharge information has been reviewed with the patient. The patient verbalized understanding. Discharge medications reviewed with the patient and appropriate educational materials and side effects teaching were provided.                Laparoscopically Assisted Vaginal Hysterectomy: What to Expect at Home  Your Recovery    You can expect to feel better and stronger each day, although you may need pain medicine for a week or two. You may get tired easily or have less energy than usual. This may last for several weeks after surgery. You will probably notice that your belly is swollen and puffy. This is common. The swelling will take several weeks to go down. It may take about 4 to 6 weeks to fully recover. The recovery time may be less for some patients. You may have some light vaginal bleeding. Don't have sex until the doctor says it is okay. Don't douche or put anything into your vagina, such as a tampon, until your doctor says it is okay. It is important to avoid lifting while you are recovering so that you can heal.  This care sheet gives you a general idea about how long it will take for you to recover. But each person recovers at a different pace. Follow the steps below to get better as quickly as possible. How can you care for yourself at home? Activity  · Rest when you feel tired. Getting enough sleep will help you recover. · Try to walk each day. Start by walking a little more than you did the day before. Bit by bit, increase the amount you walk. Walking boosts blood flow and helps prevent pneumonia and constipation. · Avoid lifting anything that would make you strain. This may include heavy grocery bags and milk containers, a heavy briefcase or backpack, cat litter or dog food bags, a vacuum , or a child. · Avoid strenuous activities, such as biking, jogging, weight lifting, or aerobic exercise, until your doctor says it is okay. · You may shower. Pat the cut (incision) dry. Do not take a bath for the first 2 weeks, or until your doctor tells you it is okay. · Ask your doctor when you can drive again. · You will probably need to take about 2 weeks off from work. It depends on the type of work you do and how you feel. · Your doctor will tell you when you can have sex again.   Diet  · You can eat your normal diet. If your stomach is upset, try bland, low-fat foods like plain rice, broiled chicken, toast, and yogurt. · Drink plenty of fluids (unless your doctor tells you not to). · You may notice that your bowel movements are not regular right after your surgery. This is common. Try to avoid constipation and straining with bowel movements. You may want to take a fiber supplement every day. If you have not had a bowel movement after a couple of days, ask your doctor about taking a mild laxative. Medicines  · Your doctor will tell you if and when you can restart your medicines. He or she will also give you instructions about taking any new medicines. · If you take blood thinners, such as warfarin (Coumadin), clopidogrel (Plavix), or aspirin, be sure to talk to your doctor. He or she will tell you if and when to start taking those medicines again. Make sure that you understand exactly what your doctor wants you to do. · Be safe with medicines. Take pain medicines exactly as directed. ¨ If the doctor gave you a prescription medicine for pain, take it as prescribed. ¨ If you are not taking a prescription pain medicine, ask your doctor if you can take an over-the-counter medicine. · If you think your pain medicine is making you sick to your stomach:  ¨ Take your medicine after meals (unless your doctor has told you not to). ¨ Ask your doctor for a different pain medicine. · If your doctor prescribed antibiotics, take them as directed. Do not stop taking them just because you feel better. You need to take the full course of antibiotics. Incision care  · You may have stitches over the cuts (incisions) the doctor made in your belly. If you have strips of tape on the incisions the doctor made, leave the tape on for a week or until it falls off. Or follow your doctor's instructions for removing the tape. · Wash the area daily with warm, soapy water, and pat it dry.  Don't use hydrogen peroxide or alcohol, which can slow healing. You may cover the area with a gauze bandage if it weeps or rubs against clothing. Change the bandage every day. · Keep the area clean and dry. Other instructions  · You may have some light vaginal bleeding. Wear sanitary pads if needed. Do not douche or use tampons. Follow-up care is a key part of your treatment and safety. Be sure to make and go to all appointments, and call your doctor if you are having problems. It's also a good idea to know your test results and keep a list of the medicines you take. When should you call for help? Call 911 anytime you think you may need emergency care. For example, call if:  · You passed out (lost consciousness). · You have severe trouble breathing. · You have sudden chest pain and shortness of breath, or you cough up blood. Call your doctor now or seek immediate medical care if:  · You have bright red vaginal bleeding that soaks one or more pads in an hour, or you have large clots. · You have foul-smelling discharge from your vagina. · You are sick to your stomach or cannot keep fluids down. · You have pain that does not get better after you take pain medicine. · You have loose stitches, or your incision comes open. · You have signs of infection, such as:  ¨ Increased pain, swelling, warmth, or redness. ¨ Red streaks leading from the incision. ¨ Pus draining from the incision. ¨ A fever. · You have signs of a blood clot, such as:  ¨ Pain in your calf, back of the knee, thigh, or groin. ¨ Redness and swelling in your leg or groin. · You have trouble passing urine or stool, especially if you have pain or swelling in your lower belly. Watch closely for changes in your health, and be sure to contact your doctor if:  · You do not have a bowel movement after taking a laxative. · You have hot flashes, sweating, flushing, or a fast heartbeat, but no fever. Where can you learn more?   Go to http://edison-lucia.info/. Enter J104 in the search box to learn more about \"Laparoscopically Assisted Vaginal Hysterectomy: What to Expect at Home. \"  Current as of: October 13, 2016  Content Version: 11.2  © 3266-5265 [employer]. Care instructions adapted under license by [employer] (which disclaims liability or warranty for this information). If you have questions about a medical condition or this instruction, always ask your healthcare professional. [place] [address] any warranty or liability for your use of this information.

## 2017-04-13 NOTE — ROUTINE PROCESS
Bedside and Verbal shift change report given to TOMMIE Quarles RN (oncoming nurse) by Aleisha Fields RN  (offgoing nurse). Report included the following information SBAR, Kardex, Intake/Output and MAR.

## 2017-04-13 NOTE — PROGRESS NOTES
Discharge Reassessment Plan:  High Risk and [facility] patients    RRAT Score:  21 or greater    High Risk Care Transition Interventions:  1. Discharge transition plan:    2. Involved patient/caregiver in assessment, planning, education and implement of intervention. 3. CM daily patient care huddles/interdisciplinary rounds were completed. 4. PCP/Specialist appointment to be scheduled within 48 hours unless otherwise specified. 5. Facilitated transportation and logistics for follow-up appointments. 6. Facilitated Medication reconciliation  Pharmacy. Date/Time  7. Formal handoff between hospital provider and post-acute provider to transition patient completed. 8. Handoff to [address] [place] [address] Nurse Navigator or PCP practice completed. Discharge follow-up phone call within 2  4 days (NN, [facility], Nursing) CM follow up as assigned. Reviewed chart for assessment of discharge planning needs for this 77yo female pt s/p hysterectomy. Pt has Medicare and her PCP is Dr. Femi Negro. Pt has medical follow-up appt scheduled with Dr. Davie Chilel (see AVS) .  There are no other discharge planning needs noted

## 2017-04-13 NOTE — PROGRESS NOTES
8340 - Bedside report received from Eula Lopez RN. Patient in bed resting at this time. Plan of care discussed with offgoing nurse. 8278 - Patient in bed at this time. Alert, unable to assess orientation being that patient is non-verbal. IV to left wrist   intact and patent. Plexi foot pump to left leg and SCDS to right leg. 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 Dermabond to 3 abdominal lap sites CDI. Radial and pedal pulses palpable. Lungs diminished. Bowel sounds active to all quadrants. Pain 0/10. Patient assisted with meal at this time. Patient able to tolerate applesauce, oatmeal and thin cranberry juice in small amounts at a time alternating food and drink using a spoon. No s/s of aspiration noted. Dr. Ivette Pena made aware. Incontinence care provided and patient repositioned. Orders given from Dr. Ivette Pena to D/C IV dilaudid. 56 - Spoke with daughter who states that she is going to transport the daughter home. Being that patient had contractures to all extremities I offered to arrange medical transport which was declined. Daughter states that she transports the patient via car to all appointment without any complications. Informed daughter of safety concern, unit manager also aware. Daughter insisted on transporting patient home via car. Daughter has patient's wheelchair for transport to car.     3766 - Discharge instructions reviewed with patient and daughter who is the care taker at this time. Opportunity for questions and clarification was provided. Patient has verbalized understanding. Patient was provided with care notes to include side effects of RX's. Arm bands removed and shredded. AVS reviewed with Shantell Cowan RN. IV removed. 4 lap sites intact with dermabond. 1525 - Patient assisted to wheelchair with daughter and nurse. 1528 - Patient discharged at this time with daughter.

## 2017-04-13 NOTE — PROGRESS NOTES
2156 Meds given crushed in applesauce. Pt alert enough to chew and swallow small bites of applesauce. Otherwise does not converse. No grunting, no obvious signs of pain. HOB left at 45 degrees. Dtr not at bedside at this time. SHIFT SUMMARY: Pt somewhat more awake as she opens eyes and reacts to stimuli. No pain meds given. Lap sites x4 intact. SCD maintained on RLE. No orellana catheter.

## 2017-04-13 NOTE — PROGRESS NOTES
I spoke with patient's daughter, Milka Luo, regarding patient's clinical condition this am and plan for discharge home. Yesterday, daughter was concerned that patient was not taking po well. This am patient ate apple sauce and oatmeal and drank cranberry juice from a spoon without any problems. Daughter was reassured. Will discharge patient home now. Patient has follow up with me in the office in 2 weeks.    Shelia Jung MD

## 2017-04-13 NOTE — DISCHARGE SUMMARY
GYN Discharge Summary                        Patient ID:  Shea Bonilla  70 y.o. Admission Date: 4/11/2017  Discharge Date: 04/13/17                                                 Attending: Marilee Ng MD   PCP: Aleja Brown NP                                                                                               Reason for admission:  1) Uterine Carcinosarcoma  2) Heavy vaginal bleeding  3) Vaginitis   4) Cystitis      Discharge  diagnosis: Active Problems:    * No active hospital problems. 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    Chronic venous insufficiency I87.2    Abnormal weight loss R63.4    Vitamin D deficiency E55.9              Past Medical History:   Diagnosis Date    Cancer ([facility] 2017    in process of being worked up, probable cancer diagnosis    Dementia     Diabetes ([facility] 1997    type 2    Hypercholesteremia     Hypertension     Ill-defined condition 2012    Alzheimers    PMB (postmenopausal bleeding)     Psychiatric disorder     Stroke ([facility] [facility] 2004, 2010    contracted left leg, non-ambulatory    Thyroid disease        Operative Procedure:   1) Total laparoscopic hysterectomy, Bilateral salpingo-oophorectomy   2) Cystoscopy    Complications:  none                   Transfusion:  none    Hospital Course: Discharge was delayed for family concern about difficulty swallowing and decreased appetite, speech pathology evaluation with improvement to baseline without intervention.  Dr. Davie Chilel witnessed patient eating applesauce, oatmeal, and fluids by spoon without aspiration or other concern. Post-op course otherwise uncomplicated. Condition at discharge: good, stable, Afebrile, Ambulating and Eating, Drinking, Voiding    Labs:  Lab Results   Component Value Date/Time    WBC 7.8 04/12/2017 04:14 AM    HGB 11.5 04/12/2017 04:14 AM    HCT 35.6 04/12/2017 04:14 AM    PLATELET 562 96/76/4053 04:14 AM    MCV 88.1 04/12/2017 04:14 AM     Lab Results   Component Value Date/Time    Sodium 143 04/12/2017 04:14 AM    Potassium 4.0 04/12/2017 04:14 AM    Chloride 109 04/12/2017 04:14 AM    CO2 20 04/12/2017 04:14 AM    Anion gap 14 04/12/2017 04:14 AM    Glucose 98 04/12/2017 04:14 AM    BUN 16 04/12/2017 04:14 AM    Creatinine 1.00 04/12/2017 04:14 AM    Creatinine 1.03 04/12/2017 04:14 AM    BUN/Creatinine ratio 16 04/12/2017 04:14 AM    GFR est AA >60 04/12/2017 04:14 AM    GFR est AA >60 04/12/2017 04:14 AM    GFR est non-AA 55 04/12/2017 04:14 AM    GFR est non-AA 53 04/12/2017 04:14 AM         Current Discharge Medication List      START taking these medications    Details   HYDROmorphone (DILAUDID) 2 mg tablet Take 1 Tab by mouth every four (4) hours as needed for Pain. Max Daily Amount: 12 mg. Indications: Pain  Qty: 60 Tab, Refills: 0         CONTINUE these medications which have NOT CHANGED    Details   omeprazole (PRILOSEC) 20 mg capsule Take 20 mg by mouth daily. cholecalciferol (VITAMIN D3) 1,000 unit cap Take 125 Units by mouth daily. amLODIPine (NORVASC) 5 mg tablet 5 mg daily. cyanocobalamin 1,000 mcg tablet 1,000 mcg daily. furosemide (LASIX) 40 mg tablet 20 mg.      hydrALAZINE (APRESOLINE) 50 mg tablet 50 mg three (3) times daily. levothyroxine (SYNTHROID) 25 mcg tablet 25 mcg daily. metoprolol tartrate (LOPRESSOR) 25 mg tablet 25 mg two (2) times a day. potassium chloride (K-DUR, KLOR-CON) 20 mEq tablet 20 mEq daily.       pravastatin (PRAVACHOL) 40 mg tablet 40 mg nightly. sertraline (ZOLOFT) 25 mg tablet Take 25 mg by mouth daily. QUEtiapine (SEROQUEL) 25 mg tablet 25 mg two (2) times a day. valsartan (DIOVAN) 160 mg tablet 160 mg daily. clopidogrel (PLAVIX) 75 mg tab 75 mg daily. insulin glargine (LANTUS SOLOSTAR) 100 unit/mL (3 mL) pen 15 Units nightly.                Patient Instructions:        Disposition:  Home    Follow-up:  Follow up with Dr. Nieto Brochure in 2 weeks    Author:   [facility]  Gynecologic Oncology  4/13/2017  9:23 AM

## 2017-04-13 NOTE — ROUTINE PROCESS
Bedside and Verbal shift change report given to MOY Aldridge RN by Karan Gardner RN. Report included the following information SBAR, Kardex, OR Summary, Intake/Output and MAR.

## 2017-04-14 NOTE — PROGRESS NOTES
Goals ongoing at time of discharge. Will resolve POC. Recommend continuation of SLP services for dysphagia.     Thank you for this referral,  Alexandrea Shoemaker M.S.Mark., CCC/SLP

## 2017-04-28 ENCOUNTER — OFFICE VISIT (OUTPATIENT)
Dept: ONCOLOGY | Age: 72
End: 2017-04-28

## 2017-04-28 VITALS
HEART RATE: 73 BPM | SYSTOLIC BLOOD PRESSURE: 126 MMHG | TEMPERATURE: 99.4 F | HEIGHT: 60 IN | DIASTOLIC BLOOD PRESSURE: 62 MMHG | OXYGEN SATURATION: 98 %

## 2017-04-28 DIAGNOSIS — L89.819: ICD-10-CM

## 2017-04-28 DIAGNOSIS — C55 CARCINOSARCOMA OF UTERUS (HCC): Primary | ICD-10-CM

## 2017-04-28 NOTE — PROGRESS NOTES
[facility]  [address] [place] [address], [address], [address] [facility]   [phone]  Efraín Bedolla MD      Patient ID:  Name:  Katelyn Mccord  MRN:  [id number]  :  1945/71 y.o. Date:  2017    REFERRING PROVIDER:  Makayla Davis MD    HISTORY OF PRESENT ILLNESS:  Katelyn Mccord is a 70 y.o.   postmenopausal female with Stage IIIA Uterine Carcinosarcoma now 2 weeks status post TLH/BSO for palliation. Patient has dementia, is nonverbal, not ambulatory, and has contractures. Today family reports appetite is improving. They deny any vaginal bleeding or discharge. Daughter does report a new ulcer on the posterior scalp. Patient has had ulcers prior and has been seen in the wound clinic in [place]. The daughter is caring for the wound in the manner that she has been taught to care for decubitus ulcers previously. PATHOLOGY:  2017  UTERUS, CERVIX, BILATERAL TUBES AND OVARIES:   SPECIMEN: Uterine corpus, cervix, right ovary, left ovary, right fallopian tube, left fallopian tube. PROCEDURE: Simple hysterectomy, bilateral salpingo-oophorectomy. LYMPH NODE SAMPLING: Not performed. SPECIMEN INTEGRITY: Intact hysterectomy specimen. HISTOLOGIC TYPE: Carcinosarcoma (malignant mixed malarian tumor), homologous type. HISTOLOGIC GRADE: Poorly differentiated (Grade 3). MYOMETRIAL INVASION: Present, depth of invasion 19.5/20 mm (98%). TUMOR INVOLVEMENT OF CERVIX: Invasion of cervical stromal connective tissue. EXTENT OF INVOLVEMENT OF OTHER ORGANS:   Left ovary: Involved. Right ovary: Not involved. Right fallopian tube: Not involved. Left fallopian tube: Not involved. MARGINS: Uninvolved by invasive carcinoma. Angiolymphatic invasion within 0.5 mm of cervical surgical   margins. LYMPH/VASCULAR INVASION: Present. PATHOLOGIC STAGING: pT3a, NX, MX.   ANCILLARY STUDIES: Not performed.      WASHINGS, PELVIC: SATISFACTORY FOR EVALUATION. CONSISTENT WITH METASTATIC ADENOCARCINOMA. 3/15/17  UTERUS, CERVIX, BIOPSY:   CARCINOSARCOMA. GENETIC TESTING:  None to date    IMAGING:  Abdominal ultrasound 3/7/17 is scanned in media    COMPREHENSIVE ROS:  Endocrine: Diabetes and Thyroid Problems  All other systems reviewed and are negative.        PROBLEM LIST:                Patient Active Problem List    Diagnosis Date Noted    Subacute vaginitis 03/15/2017    Malignant neoplasm of uterus ([facility] [address] 03/15/2017    Post-menopausal bleeding 03/15/2017    Dyslipidemia 04/22/2016    Late onset Alzheimer disease 04/22/2016    Type 2 diabetes mellitus ([address] 04/22/2016    Pancreatic neoplasm 11/14/2012    Abnormal weight loss 10/10/2012    Disease of lung 03/31/2011    Chronic venous insufficiency 03/31/2011    Vitamin D deficiency 03/31/2011    Hypertension 03/09/2011    Peripheral arterial occlusive disease ([facility] [address] [facility] 03/09/2011    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           Family Hx:                Family History   Problem Relation Age of Onset    Cancer Sister     Colon Cancer Nephew        Social Hx:                Social History   Substance Use Topics    Smoking status: Never Smoker    Smokeless tobacco: Never Used    Alcohol use No       Surgical Hx:                Past Surgical History:   Procedure Laterality Date    HX BACK SURGERY      pinched nerve    HX OTHER SURGICAL      abdominal surgery unknown specific possible gallbladder or appendix       Past Medical Hx:                Past Medical History:   Diagnosis Date    Cancer ([facility] 2017    in process of being worked up, probable cancer diagnosis    Dementia     Diabetes ([facility] [address] 1997    type 2    Hypercholesteremia     Hypertension     Ill-defined condition 2012    Alzheimers    PMB (postmenopausal bleeding)     Psychiatric disorder     Stroke ([facility] [address] [facility] 2004, 2010    contracted left leg, non-ambulatory    Thyroid disease        Medications:     Current Outpatient Prescriptions   Medication Sig    HYDROmorphone (DILAUDID) 2 mg tablet Take 1 Tab by mouth every four (4) hours as needed for Pain. Max Daily Amount: 12 mg. Indications: Pain    omeprazole (PRILOSEC) 20 mg capsule Take 20 mg by mouth daily.  cholecalciferol (VITAMIN D3) 1,000 unit cap Take 125 Units by mouth daily.  amLODIPine (NORVASC) 5 mg tablet 5 mg daily.  clopidogrel (PLAVIX) 75 mg tab 75 mg daily.  cyanocobalamin 1,000 mcg tablet 1,000 mcg daily.  furosemide (LASIX) 40 mg tablet 20 mg.    hydrALAZINE (APRESOLINE) 50 mg tablet 50 mg three (3) times daily.  insulin glargine (LANTUS SOLOSTAR) 100 unit/mL (3 mL) pen 15 Units nightly.  levothyroxine (SYNTHROID) 25 mcg tablet 25 mcg daily.  metoprolol tartrate (LOPRESSOR) 25 mg tablet 25 mg two (2) times a day.  potassium chloride (K-DUR, KLOR-CON) 20 mEq tablet 20 mEq daily.  pravastatin (PRAVACHOL) 40 mg tablet 40 mg nightly.  sertraline (ZOLOFT) 25 mg tablet Take 25 mg by mouth daily.  QUEtiapine (SEROQUEL) 25 mg tablet 25 mg two (2) times a day.  valsartan (DIOVAN) 160 mg tablet 160 mg daily. No current facility-administered medications for this visit. Allergies:      Allergies   Allergen Reactions    Haloperidol Lactate Other (comments)    Lisinopril Unknown (comments)    Rosiglitazone Unknown (comments)         OBJECTIVE:    Physical Exam  VITAL SIGNS: Visit Vitals    /62 (BP 1 Location: Other(comment), BP Patient Position: Other (comment))  Comment (BP 1 Location): left forearm  Comment (BP Patient Position): Sitting in wheelchair    Pulse 73    Temp 99.4 °F (37.4 °C) (Axillary)    Ht 5' (1.524 m)    SpO2 98%      GENERAL RAF: in no apparent distress and well developed and well nourished   HEENT: NCAT,EOMI,PERRL   RESPIRATORY: lungs clear to auscultation, no wheezing, rhonchi, or crackles   CARDIOVASC: Regular rate and rhythm or without murmur or extra heart sounds   GASTROINT: soft, non-tender, non-distended, without masses or organomegaly, normal active bowel sounds   MUSCULOSKEL: no joint tenderness, deformity or swelling   INTEGUMENT:  warm and dry, no rashes or lesions   EXTREMITIES: extremities normal, atraumatic, no cyanosis or edema   PELVIC: External genitalia: normal general appearance  Urinary system: urethral meatus normal  Vaginal: atrophic mucosa and presence of blood  Cervix: deviated to the left with purulent, malodorous cervical drainage and blood from os. Papillary tumor is protruding from the cervical os. Biopsy of the prolapsing tumor was performed. Adnexa: non palpable  Uterus: irregular enlargement  Rectal: normal appearing anus   RECTAL: deferred   FLY SURVEY: Cervical, supraclavicular, axillary and inguinal nodes normal.   NEURO: Grossly normal         IMPRESSION/PLAN:  Stage IIIA Uterine Carcinosarcoma   Vaginitis, resolved with Flagyl prescribed   Counseled family present with patient today regarding surgical and pathologic findings. Surgery was performed for palliation of bleeding and malodorous vaginal drainage. No perioperative complications. Wound on posterior scalp. Defer management to wound care and PCP. Discussed surveillance with family. We agreed to prn follow up. Family will call for appointment if patient has vaginal bleeding or discharge. Natalie Mcdaniel.  Lorie RUBIN  Gynecologic Oncology  0/34/83132:06 PM

## 2017-04-28 NOTE — MR AVS SNAPSHOT
Visit Information Date & Time Provider Department Dept. Phone Encounter #  
 4/28/2017  1:15 PM MD Sinan [facility] [phone] Upcoming Health Maintenance Date Due Hepatitis C Screening 1945 LIPID PANEL Q1 1945 FOOT EXAM Q1 8/21/1955 MICROALBUMIN Q1 8/21/1955 EYE EXAM RETINAL OR DILATED Q1 8/21/1955 BREAST CANCER SCRN MAMMOGRAM 8/21/1995 FOBT Q 1 YEAR AGE 50-75 8/21/1995 ZOSTER VACCINE AGE 60> 8/21/2005 GLAUCOMA SCREENING Q2Y 8/21/2010 OSTEOPOROSIS SCREENING (DEXA) 8/21/2010 MEDICARE YEARLY EXAM 8/21/2010 HEMOGLOBIN A1C Q6M 9/29/2017 DTaP/Tdap/Td series (3 - Td) 4/11/2022 Allergies as of 4/28/2017  Review Complete On: 4/28/2017 By: Jj Sanchez MD  
  
 Severity Noted Reaction Type Reactions Haloperidol Lactate High 08/04/2013    Other (comments) Lisinopril High 03/31/2011    Unknown (comments) Rosiglitazone High 03/31/2011    Unknown (comments) Current Immunizations  Reviewed on 4/12/2017 Name Date Influenza Vaccine 10/24/2016 12:00 AM, 10/14/2015 12:00 AM, 10/10/2012 12:00 AM, 10/6/2011 12:00 AM, 2/28/2011 12:00 AM, 10/18/2010 12:00 AM, 10/15/2002 12:00 AM  
 Influenza Vaccine (Quad) 10/10/2012 12:00 AM  
 Pneumococcal Conjugate (PCV-13) 10/14/2015 12:00 AM  
 Pneumococcal Polysaccharide (PPSV-23) 6/1/2012 12:00 AM, 12/10/1999 12:00 AM  
 Tdap 4/11/2012 12:00 AM, 9/18/2009 12:00 AM  
  
 Not reviewed this visit You Were Diagnosed With   
  
 Codes Comments Carcinosarcoma of uterus ([facility] [address]    -  Primary ICD-10-CM: L91 ICD-9-CM: 624 Decubitus ulcer of scalp, unspecified pressure ulcer stage     ICD-10-CM: S22.205 ICD-9-CM: 707.09 Vitals BP Pulse Temp Height(growth percentile) 126/62 (BP 1 Location: Other(comment), BP Patient Position: Other (comment)) 73 99.4 °F (37.4 °C) (Axillary) 5' (1.524 m) SpO2 OB Status Smoking Status 98% Postmenopausal Never Smoker Preferred Pharmacy Pharmacy Name [facility] [address], [address] [place] [address] [phone] Your Updated Medication List  
  
   
This list is accurate as of: 4/28/17  2:06 PM.  Always use your most recent med list. amLODIPine 5 mg tablet Commonly known as:  Charlotte Cradle 5 mg daily. clopidogrel 75 mg Tab Commonly known as:  PLAVIX 75 mg daily. cyanocobalamin 1,000 mcg tablet  
1,000 mcg daily. furosemide 40 mg tablet Commonly known as:  LASIX  
20 mg.  
  
 hydrALAZINE 50 mg tablet Commonly known as:  APRESOLINE 50 mg three (3) times daily. HYDROmorphone 2 mg tablet Commonly known as:  DILAUDID Take 1 Tab by mouth every four (4) hours as needed for Pain. Max Daily Amount: 12 mg. Indications: Pain  
  
 insulin glargine 100 unit/mL (3 mL) pen Commonly known as:  LANTUS SOLOSTAR  
15 Units nightly. levothyroxine 25 mcg tablet Commonly known as:  SYNTHROID  
25 mcg daily. metoprolol tartrate 25 mg tablet Commonly known as:  LOPRESSOR  
25 mg two (2) times a day. omeprazole 20 mg capsule Commonly known as:  PRILOSEC Take 20 mg by mouth daily. potassium chloride 20 mEq tablet Commonly known as:  K-DUR, KLOR-CON 20 mEq daily. pravastatin 40 mg tablet Commonly known as:  PRAVACHOL 40 mg nightly. QUEtiapine 25 mg tablet Commonly known as:  SEROquel 25 mg two (2) times a day. sertraline 25 mg tablet Commonly known as:  ZOLOFT Take 25 mg by mouth daily. valsartan 160 mg tablet Commonly known as:  DIOVAN  
160 mg daily. VITAMIN D3 1,000 unit Cap Generic drug:  cholecalciferol Take 125 Units by mouth daily. Introducing [facility]! Edwina Vee introduces [employer] patient portal. Now you can access parts of your medical record, email your doctor's office, and request medication refills online. 1. In your internet browser, go to [email or web address] 2. Click on the First Time User? Click Here link in the Sign In box. You will see the New Member Sign Up page. 3. Enter your [employer] Access Code exactly as it appears below. You will not need to use this code after youve completed the sign-up process. If you do not sign up before the expiration date, you must request a new code. · [employer] Access Code: [email or web address] Expires: 6/13/2017  4:28 PM 
 
4. Enter the last four digits of your Social Security Number (xxxx) and Date of Birth (mm/dd/yyyy) as indicated and click Submit. You will be taken to the next sign-up page. 5. Create a inEarth ID. This will be your inEarth login ID and cannot be changed, so think of one that is secure and easy to remember. 6. Create a [employer] password. You can change your password at any time. 7. Enter your Password Reset Question and Answer. This can be used at a later time if you forget your password. 8. Enter your e-mail address. You will receive e-mail notification when new information is available in [address] 9. Click Sign Up. You can now view and download portions of your medical record. 10. Click the Download Summary menu link to download a portable copy of your medical information. If you have questions, please visit the Frequently Asked Questions section of the [employer] website. Remember, [employer] is NOT to be used for urgent needs. For medical emergencies, dial 911. Now available from your iPhone and Android! Please provide this summary of care documentation to your next provider. Your primary care clinician is listed as [address]. If you have any questions after today's visit, please call [phone].

## 2017-05-16 ENCOUNTER — DOCUMENTATION ONLY (OUTPATIENT)
Dept: ONCOLOGY | Age: 72
End: 2017-05-16

## 2017-05-16 NOTE — PROGRESS NOTES
Received call from Dr. Db Chaudhry (ED [facility]) to clarifiy surgical findings and extent of malignancy. Family has taken patient to the ED with complaint of decreased level of activity and poor po intake. Explained to Dr. Db Chaudhry that patient has a high grade uterine cancer with a high risk of recurrence although no visible residual disease was remaining at the time of surgery 4/11/2017. Surgery was done for palliation of vaginal bleeding and malodorous vaginal discharge from necrotic tumor. We are unable to rule out metastasis at this time due to patient's inability to lie flat and still for standard imaging or PET. I recommended doing an abdominal xray to rule out bowel dilation that could suggest ileus or obstruction.    Gene Gibson MD

## 2019-08-26 NOTE — PROGRESS NOTES
[facility]  [address] [place] [address], [address], [address] [place]  [phone]  Godwin Abad MD      Patient ID:  Name:  Radha Cedeño  MRN:  [id number]  :  1945/71 y.o. Date:  3/15/2017    REFERRING PROVIDER:  Dary Bess MD    HISTORY OF PRESENT ILLNESS:  Radha Cedeño is a 70 y.o.   postmenopausal female referred by Dr Naseem Tello for possible endometrial cancer. Presents today in a wheelchair with her Daughter who is her primary caretaker. She has dementia, is nonverbal, not ambulatory, and has contractures. Daughter reports vaginal bleeding started 3 weeks ago, ranges from brown to red in color, and has a strong odor. Daughter has not noted any signs of the patinet being in pain. Patient is incontinent for bowel and bladder. PAP smear was collected by Dr Naseem Tello on 3/7/17 and is REKHA, suspicious for adenocarcinoma of the endometrium. PATHOLOGY:  None to date    LABS:  None to date    GENETIC TESTING:  None to date    IMAGING:  Abdominal ultrasound 3/7/17 is scanned in media    COMPREHENSIVE ROS:  Constitutional: Appetite Change and Weight Change  Genito-Urinary: Blood in Urine and Vaginal Discharge/Spotting  Endocrine: Diabetes and Thyroid Problems  Neuro: Stroke and Memory Change  All other systems reviewed and are negative.        PROBLEM LIST:                Patient Active Problem List    Diagnosis Date Noted    Subacute vaginitis 03/15/2017    Malignant neoplasm of uterus ([facility] [address] 03/15/2017    Post-menopausal bleeding 03/15/2017    Dyslipidemia 2016    Late onset Alzheimer disease 2016    Type 2 diabetes mellitus ([facility] [address] 2016    Pancreatic neoplasm 2012    Abnormal weight loss 10/10/2012    Disease of lung 2011    Chronic venous insufficiency 2011    Vitamin D deficiency 2011    Hypertension 2011    Peripheral arterial occlusive disease ([facility] [address] 2011    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           Family Hx:                Family History   Problem Relation Age of Onset   [facility] Cancer Sister     Colon Cancer Nephew        Social Hx:                Social History   Substance Use Topics    Smoking status: Never Smoker    Smokeless tobacco: Not on file    Alcohol use No       Surgical Hx:                Past Surgical History:   Procedure Laterality Date    HX BACK SURGERY      pinched nerve    HX OTHER SURGICAL      abdominal surgery unknown specific possible gallbladder or appendix       Past Medical Hx:                Past Medical History:   Diagnosis Date    Dementia     Diabetes ([address]     Hypercholesteremia     Hypertension     PMB (postmenopausal bleeding)        Medications:     Current Outpatient Prescriptions   Medication Sig    metroNIDAZOLE (FLAGYL) 500 mg tablet Take 1 Tab by mouth three (3) times daily for 14 days.  amLODIPine (NORVASC) 5 mg tablet 5 mg.  glucose blood VI test strips (ASCENSIA AUTODISC VI, ONE TOUCH ULTRA TEST VI) strip 50 Each.  clopidogrel (PLAVIX) 75 mg tab 75 mg.  cyanocobalamin 1,000 mcg tablet 1,000 mcg.  ergocalciferol (ERGOCALCIFEROL) 50,000 unit capsule 50,000 Units.  furosemide (LASIX) 40 mg tablet 20 mg.    hydrALAZINE (APRESOLINE) 50 mg tablet 50 mg.    insulin glargine (LANTUS SOLOSTAR) 100 unit/mL (3 mL) pen 15 Units.  Insulin Needles, Disposable, 31 gauge x 3/16\" ndle 1 Units.  levothyroxine (SYNTHROID) 25 mcg tablet 25 mcg.  metoprolol tartrate (LOPRESSOR) 25 mg tablet 25 mg.  potassium chloride (K-DUR, KLOR-CON) 20 mEq tablet 20 mEq.  pravastatin (PRAVACHOL) 40 mg tablet 40 mg.    sertraline (ZOLOFT) 25 mg tablet 25 mg.    QUEtiapine (SEROQUEL) 25 mg tablet 25 mg.    valsartan (DIOVAN) 160 mg tablet 160 mg. No current facility-administered medications for this visit. Allergies:      Allergies   Allergen Reactions    Haloperidol Lactate Other (comments)    Lisinopril Unknown (comments)    Rosiglitazone Unknown (comments)         OBJECTIVE:    Physical Exam  VITAL SIGNS: Visit Vitals    /61    Pulse 69    Temp 96.7 °F (35.9 °C) (Oral)    Resp 18      GENERAL RAF: in no apparent distress and well developed and well nourished   HEENT: NCAT,EOMI,PERRL   RESPIRATORY: lungs clear to auscultation, no wheezing, rhonchi, or crackles   CARDIOVASC: Regular rate and rhythm or without murmur or extra heart sounds   GASTROINT: soft, non-tender, non-distended, without masses or organomegaly, normal active bowel sounds   MUSCULOSKEL: no joint tenderness, deformity or swelling   INTEGUMENT:  warm and dry, no rashes or lesions   EXTREMITIES: extremities normal, atraumatic, no cyanosis or edema   PELVIC: External genitalia: normal general appearance  Urinary system: urethral meatus normal  Vaginal: atrophic mucosa and presence of blood  Cervix: deviated to the left with purulent, malodorous cervical drainage and blood from os. Papillary tumor is protruding from the cervical os. Biopsy of the prolapsing tumor was performed. Adnexa: non palpable  Uterus: irregular enlargement  Rectal: normal appearing anus   RECTAL: deferred   FLY SURVEY: Cervical, supraclavicular, axillary and inguinal nodes normal.   NEURO: Grossly normal         IMPRESSION/PLAN:  Post menopausal bleeding. Examination today consistent with Uterine malignancy favor carcinosarcoma. Biopsy performed today. Vaginitis. Flagyl prescribed   Counseled family present with patient today regarding options for treatment. Patient would not be able to have CT scan to evaluate for metastatic disease due to dementia and contractures. Discussed option of diagnostic laparoscopy with TLH/BSO which would be an option and helpful to palliate symptoms of vaginal bleeding even if occult metastasis is present. Patient will follow up with family once pathology available to further discuss treatment options. Warden Young.  Vinod Rosales MD  Gynecologic Oncology  3/42/05940:31 PM given to family

## (undated) DEVICE — Device

## (undated) DEVICE — APPLICATOR SEAL FLOSEAL 5MM+ --

## (undated) DEVICE — CYSTO/BLADDER IRRIGATION SET, REGULATING CLAMP

## (undated) DEVICE — TROCAR LAP L100MM DIA5MM BLDELSS W/ STBL SL ENDOPATH XCEL

## (undated) DEVICE — SUT VCRL + 0 36IN CT1 UD --

## (undated) DEVICE — GDWIRE 3CM FLX-TIP 0.038X150CM -- BX/5 SENSOR

## (undated) DEVICE — AIRSEAL 5 MM ACCESS PORT AND LOW PROFILE OBTURATOR WITH BLADELESS OPTICAL TIP, 100 MM LENGTH: Brand: AIRSEAL

## (undated) DEVICE — LEGGINGS, PAIR, 31X48, STERILE: Brand: MEDLINE

## (undated) DEVICE — REM POLYHESIVE ADULT PATIENT RETURN ELECTRODE: Brand: VALLEYLAB

## (undated) DEVICE — 3M™ IOBAN™ 2 ANTIMICROBIAL INCISE DRAPE 6650EZ: Brand: IOBAN™ 2

## (undated) DEVICE — TK® QUICK LOAD® UNIT: Brand: TK® QUICK LOAD®

## (undated) DEVICE — MATERNITY PAD,HEAVY: Brand: CURITY

## (undated) DEVICE — 5MM RD180® DEVICE: Brand: RD180® - THE RUNNING DEVICE®

## (undated) DEVICE — SYRINGE MED 20ML STD CLR PLAS LUERLOCK TIP N CTRL DISP

## (undated) DEVICE — TRAP MUCUS SPECIMEN 40ML -- MEDICHOICE

## (undated) DEVICE — VCARE MEDIUM, UTERINE MANIPULATOR, VAGINAL-CERVICAL-AHLUWALIA'S-RETRACTOR-ELEVATOR: Brand: VCARE

## (undated) DEVICE — VISUALIZATION SYSTEM: Brand: CLEARIFY

## (undated) DEVICE — TRAY PREP DRY W/ PREM GLV 2 APPL 6 SPNG 2 UNDPD 1 OVERWRAP

## (undated) DEVICE — DISPOSABLE SUCTION/IRRIGATOR TUBE SET WITH TIP: Brand: AHTO

## (undated) DEVICE — SOLUTION IV D10 1000 ML INJ BG

## (undated) DEVICE — (D)SYR 10ML 1/5ML GRAD NSAF -- PKGING CHANGE USE ITEM 338027

## (undated) DEVICE — LIGHT HANDLE: Brand: DEVON

## (undated) DEVICE — Z INACTIVE USE 2527070 DRAPE SURG W40XL44IN UNDERBUTTOCK SMS POLYPR W/ PCH BK DISP

## (undated) DEVICE — RD® QUICK LOAD® SURGICAL SUTURE, 2-0 MONOGLIDE®, ABSORBABLE, 53", PURPLE, MONOFILAMENT: Brand: RD® QUICK LOAD®

## (undated) DEVICE — FLOSEAL MATRIX IS INDICATED IN SURGICAL PROCEDURES (OTHER THAN IN OPHTHALMIC) AS AN ADJUNCT TO HEMOSTASIS WHEN CONTROL OF BLEEDING BY LIGATURE OR CONVENTIONAL PROCEDURES IS INEFFECTIVE OR IMPRACTICAL.: Brand: FLOSEAL HEMOSTATIC MATRIX

## (undated) DEVICE — CATHETERIZATION TRAY 16 FR FOL DRAINAGE BG LUBRI-SIL IC

## (undated) DEVICE — SPONGE LAP 18X18IN STRL -- 5/PK

## (undated) DEVICE — SOLUTION IRRIG 3000ML LAC R FLX CONT

## (undated) DEVICE — TK® TI-KNOT® DEVICE: Brand: TK® TI-KNOT®

## (undated) DEVICE — 40580 - THE PINK PAD - ADVANCED TRENDELENBURG POSITIONING KIT: Brand: 40580 - THE PINK PAD - ADVANCED TRENDELENBURG POSITIONING KIT

## (undated) DEVICE — FORCEPS BPLR DIA5MM MACRO JAW LAP ENDOPATH

## (undated) DEVICE — SHEAR HARMONIC ACET 5MMX36CM -- ACE PLUS

## (undated) DEVICE — SHEET,DRAPE,40X58,STERILE: Brand: MEDLINE

## (undated) DEVICE — (D)PACK LAP CHOLE MIH -- DISC BY MFR USE ITEM 338834

## (undated) DEVICE — INTENDED FOR TISSUE SEPARATION, AND OTHER PROCEDURES THAT REQUIRE A SHARP SURGICAL BLADE TO PUNCTURE OR CUT.: Brand: BARD-PARKER ® CARBON RIB-BACK BLADES

## (undated) DEVICE — DERMABOND SKIN ADH 0.7ML -- DERMABOND ADVANCED 12/BX

## (undated) DEVICE — TRI-LUMEN FILTERED TUBE SET WITH ACTIVATED CHARCOAL FILTER: Brand: AIRSEAL

## (undated) DEVICE — 4-PORT MANIFOLD: Brand: NEPTUNE 2

## (undated) DEVICE — DRAPE TWL SURG 16X26IN BLU ORB04] ALLCARE INC]

## (undated) DEVICE — NEEDLE HYPO 22GA L1.5IN BLK S STL HUB POLYPR SHLD REG BVL

## (undated) DEVICE — SUT MONOCRYL PLUS UD 4-0 --